# Patient Record
Sex: FEMALE | Race: WHITE | NOT HISPANIC OR LATINO | URBAN - METROPOLITAN AREA
[De-identification: names, ages, dates, MRNs, and addresses within clinical notes are randomized per-mention and may not be internally consistent; named-entity substitution may affect disease eponyms.]

---

## 2018-12-14 ENCOUNTER — EMERGENCY (EMERGENCY)
Facility: HOSPITAL | Age: 78
LOS: 0 days | Discharge: HOME | End: 2018-12-15
Attending: EMERGENCY MEDICINE | Admitting: EMERGENCY MEDICINE

## 2018-12-14 VITALS
RESPIRATION RATE: 18 BRPM | TEMPERATURE: 99 F | DIASTOLIC BLOOD PRESSURE: 93 MMHG | OXYGEN SATURATION: 98 % | SYSTOLIC BLOOD PRESSURE: 152 MMHG | HEART RATE: 100 BPM

## 2018-12-14 VITALS — HEIGHT: 63 IN | WEIGHT: 147.93 LBS

## 2018-12-14 DIAGNOSIS — K21.9 GASTRO-ESOPHAGEAL REFLUX DISEASE WITHOUT ESOPHAGITIS: ICD-10-CM

## 2018-12-14 DIAGNOSIS — Z79.899 OTHER LONG TERM (CURRENT) DRUG THERAPY: ICD-10-CM

## 2018-12-14 DIAGNOSIS — R13.10 DYSPHAGIA, UNSPECIFIED: ICD-10-CM

## 2018-12-14 DIAGNOSIS — R06.00 DYSPNEA, UNSPECIFIED: ICD-10-CM

## 2018-12-14 DIAGNOSIS — E78.00 PURE HYPERCHOLESTEROLEMIA, UNSPECIFIED: ICD-10-CM

## 2018-12-14 DIAGNOSIS — Z79.82 LONG TERM (CURRENT) USE OF ASPIRIN: ICD-10-CM

## 2018-12-14 DIAGNOSIS — Z79.891 LONG TERM (CURRENT) USE OF OPIATE ANALGESIC: ICD-10-CM

## 2018-12-14 DIAGNOSIS — E11.9 TYPE 2 DIABETES MELLITUS WITHOUT COMPLICATIONS: ICD-10-CM

## 2018-12-14 DIAGNOSIS — I10 ESSENTIAL (PRIMARY) HYPERTENSION: ICD-10-CM

## 2018-12-14 LAB
ALBUMIN SERPL ELPH-MCNC: 4.3 G/DL — SIGNIFICANT CHANGE UP (ref 3.5–5.2)
ALP SERPL-CCNC: 61 U/L — SIGNIFICANT CHANGE UP (ref 30–115)
ALT FLD-CCNC: 22 U/L — SIGNIFICANT CHANGE UP (ref 0–41)
ANION GAP SERPL CALC-SCNC: 16 MMOL/L — HIGH (ref 7–14)
APTT BLD: 26.7 SEC — LOW (ref 27–39.2)
AST SERPL-CCNC: 37 U/L — SIGNIFICANT CHANGE UP (ref 0–41)
BASOPHILS # BLD AUTO: 0.04 K/UL — SIGNIFICANT CHANGE UP (ref 0–0.2)
BASOPHILS NFR BLD AUTO: 0.4 % — SIGNIFICANT CHANGE UP (ref 0–1)
BILIRUB SERPL-MCNC: 0.6 MG/DL — SIGNIFICANT CHANGE UP (ref 0.2–1.2)
BUN SERPL-MCNC: 21 MG/DL — HIGH (ref 10–20)
CALCIUM SERPL-MCNC: 9.8 MG/DL — SIGNIFICANT CHANGE UP (ref 8.5–10.1)
CHLORIDE SERPL-SCNC: 95 MMOL/L — LOW (ref 98–110)
CO2 SERPL-SCNC: 27 MMOL/L — SIGNIFICANT CHANGE UP (ref 17–32)
CREAT SERPL-MCNC: 1 MG/DL — SIGNIFICANT CHANGE UP (ref 0.7–1.5)
EOSINOPHIL # BLD AUTO: 0.07 K/UL — SIGNIFICANT CHANGE UP (ref 0–0.7)
EOSINOPHIL NFR BLD AUTO: 0.6 % — SIGNIFICANT CHANGE UP (ref 0–8)
GLUCOSE SERPL-MCNC: 224 MG/DL — HIGH (ref 70–99)
HCT VFR BLD CALC: 40.2 % — SIGNIFICANT CHANGE UP (ref 37–47)
HGB BLD-MCNC: 14 G/DL — SIGNIFICANT CHANGE UP (ref 12–16)
IMM GRANULOCYTES NFR BLD AUTO: 0.3 % — SIGNIFICANT CHANGE UP (ref 0.1–0.3)
INR BLD: 0.92 RATIO — SIGNIFICANT CHANGE UP (ref 0.65–1.3)
LYMPHOCYTES # BLD AUTO: 2.41 K/UL — SIGNIFICANT CHANGE UP (ref 1.2–3.4)
LYMPHOCYTES # BLD AUTO: 22 % — SIGNIFICANT CHANGE UP (ref 20.5–51.1)
MCHC RBC-ENTMCNC: 29.9 PG — SIGNIFICANT CHANGE UP (ref 27–31)
MCHC RBC-ENTMCNC: 34.8 G/DL — SIGNIFICANT CHANGE UP (ref 32–37)
MCV RBC AUTO: 85.7 FL — SIGNIFICANT CHANGE UP (ref 81–99)
MONOCYTES # BLD AUTO: 0.57 K/UL — SIGNIFICANT CHANGE UP (ref 0.1–0.6)
MONOCYTES NFR BLD AUTO: 5.2 % — SIGNIFICANT CHANGE UP (ref 1.7–9.3)
NEUTROPHILS # BLD AUTO: 7.81 K/UL — HIGH (ref 1.4–6.5)
NEUTROPHILS NFR BLD AUTO: 71.5 % — SIGNIFICANT CHANGE UP (ref 42.2–75.2)
NRBC # BLD: 0 /100 WBCS — SIGNIFICANT CHANGE UP (ref 0–0)
PLATELET # BLD AUTO: 229 K/UL — SIGNIFICANT CHANGE UP (ref 130–400)
POTASSIUM SERPL-MCNC: 5.7 MMOL/L — HIGH (ref 3.5–5)
POTASSIUM SERPL-SCNC: 5.7 MMOL/L — HIGH (ref 3.5–5)
PROT SERPL-MCNC: 6.9 G/DL — SIGNIFICANT CHANGE UP (ref 6–8)
PROTHROM AB SERPL-ACNC: 10.6 SEC — SIGNIFICANT CHANGE UP (ref 9.95–12.87)
RBC # BLD: 4.69 M/UL — SIGNIFICANT CHANGE UP (ref 4.2–5.4)
RBC # FLD: 12.1 % — SIGNIFICANT CHANGE UP (ref 11.5–14.5)
SODIUM SERPL-SCNC: 138 MMOL/L — SIGNIFICANT CHANGE UP (ref 135–146)
TROPONIN T SERPL-MCNC: <0.01 NG/ML — SIGNIFICANT CHANGE UP
WBC # BLD: 10.93 K/UL — HIGH (ref 4.8–10.8)
WBC # FLD AUTO: 10.93 K/UL — HIGH (ref 4.8–10.8)

## 2018-12-14 NOTE — ED PROVIDER NOTE - MEDICAL DECISION MAKING DETAILS
pt with neg w/u after choking, attempted to contact dr. atwood, pt is advised to f/u with pmd and gi for dysphagia w/u

## 2018-12-14 NOTE — ED PROVIDER NOTE - PHYSICAL EXAMINATION
CONSTITUTIONAL: Well-appearing; well-nourished; in no apparent distress.   EYES: PERRL; EOM intact.   ENT: normal nose; no rhinorrhea; normal pharynx with no tonsillar hypertrophy. .   CARDIOVASCULAR: Normal S1, S2; no murmurs, rubs, or gallops.   RESPIRATORY: Normal chest excursion with respiration; breath sounds clear and equal bilaterally; no wheezes, rhonchi, or rales.  GI/: Normal bowel sounds; non-distended; non-tender; no palpable organomegaly.   MS: No evidence of trauma or deformity.   SKIN: Normal for age and race; warm; dry; good turgor; no apparent lesions or exudate.   NEURO/PSYCH: A & O x 4; grossly unremarkable.

## 2018-12-14 NOTE — ED PROVIDER NOTE - ATTENDING CONTRIBUTION TO CARE
79 yo f with pmh of dm, gerd, htn, presents with c/o choking on water.  pt was watching tv and started to choke on a sip of water.  as per family, pt has had choking episodes x 4 months, more frequent recently.  as per family, pmd dr. dorothy atwood is aware.  pt has also had slurred speech for about a year and has seen ent and neurology.  family denies pt has had dx of stroke.  as per family, pt had seen a "specialist" for tests for the choking and results came back normal.  pt now feels at baseline.  no cp, no abd pain.  exam: nad, ncat, perrl, eomi, mmm, op open and patent, no swelling, rrr, ctab, abd soft, nt,nd aox3, slurred speech (baseline) imp: pt with increasing episodes of choking on po intake, unclear if ever had w/u for cva, speech and swallow eval.  will check labs and cxr here.  pt is at baseline.  will contact pmd and encourage outpt follow up for dysphagia

## 2018-12-14 NOTE — ED ADULT NURSE NOTE - NSIMPLEMENTINTERV_GEN_ALL_ED
Implemented All Universal Safety Interventions:  [place] to call system. Call bell, personal items and telephone within reach. Instruct patient to call for assistance. Room bathroom lighting operational. Non-slip footwear when patient is off stretcher. Physically safe environment: no spills, clutter or unnecessary equipment. Stretcher in lowest position, wheels locked, appropriate side rails in place.

## 2018-12-14 NOTE — ED PROVIDER NOTE - OBJECTIVE STATEMENT
77 yo female hx of HTN/HLD/DM/GERD present c/o palpitations and sob after a "choking" episode. as per family, patient has had difficulty to swallow over the past year and worsening over the past few months. Family reported she was seen by ENT and Neurology without significant finding. Patient was drinking water and had another episode again tonight so family brought her to ED for evaluation.   Denies exogenous hormone use/recent hospitalization/hx of DVT. denies recent illness/fever/chill/HA/dizziness/sob/abd pain/n/v/d/urinary sxs.

## 2018-12-14 NOTE — ED ADULT NURSE NOTE - OBJECTIVE STATEMENT
pt presents to the ED with c/o having difficulty breathing and a choking sensation today that lasted 5 minutes as per son. PT denies cp,fevers/chills,n/v/d.

## 2018-12-14 NOTE — ED ADULT NURSE NOTE - PMH
DM (diabetes mellitus)    GERD (gastroesophageal reflux disease)    High cholesterol    HTN (hypertension)

## 2019-03-12 PROBLEM — E11.9 TYPE 2 DIABETES MELLITUS WITHOUT COMPLICATIONS: Chronic | Status: ACTIVE | Noted: 2018-12-14

## 2019-03-12 PROBLEM — K21.9 GASTRO-ESOPHAGEAL REFLUX DISEASE WITHOUT ESOPHAGITIS: Chronic | Status: ACTIVE | Noted: 2018-12-14

## 2019-03-12 PROBLEM — E78.00 PURE HYPERCHOLESTEROLEMIA, UNSPECIFIED: Chronic | Status: ACTIVE | Noted: 2018-12-14

## 2019-03-12 PROBLEM — I10 ESSENTIAL (PRIMARY) HYPERTENSION: Chronic | Status: ACTIVE | Noted: 2018-12-14

## 2019-03-15 ENCOUNTER — INPATIENT (INPATIENT)
Facility: HOSPITAL | Age: 79
LOS: 3 days | Discharge: HOME | End: 2019-03-19
Attending: FAMILY MEDICINE | Admitting: FAMILY MEDICINE
Payer: MEDICAID

## 2019-03-15 VITALS
SYSTOLIC BLOOD PRESSURE: 138 MMHG | DIASTOLIC BLOOD PRESSURE: 79 MMHG | HEART RATE: 89 BPM | RESPIRATION RATE: 18 BRPM | OXYGEN SATURATION: 99 % | TEMPERATURE: 97 F

## 2019-03-15 LAB
ALBUMIN SERPL ELPH-MCNC: 4.6 G/DL — SIGNIFICANT CHANGE UP (ref 3.5–5.2)
ALP SERPL-CCNC: 76 U/L — SIGNIFICANT CHANGE UP (ref 30–115)
ALT FLD-CCNC: 18 U/L — SIGNIFICANT CHANGE UP (ref 0–41)
ANION GAP SERPL CALC-SCNC: 15 MMOL/L — HIGH (ref 7–14)
APPEARANCE UR: CLEAR — SIGNIFICANT CHANGE UP
APTT BLD: 29.9 SEC — SIGNIFICANT CHANGE UP (ref 27–39.2)
AST SERPL-CCNC: 23 U/L — SIGNIFICANT CHANGE UP (ref 0–41)
BASOPHILS # BLD AUTO: 0.04 K/UL — SIGNIFICANT CHANGE UP (ref 0–0.2)
BASOPHILS NFR BLD AUTO: 0.6 % — SIGNIFICANT CHANGE UP (ref 0–1)
BILIRUB SERPL-MCNC: 0.9 MG/DL — SIGNIFICANT CHANGE UP (ref 0.2–1.2)
BILIRUB UR-MCNC: NEGATIVE — SIGNIFICANT CHANGE UP
BUN SERPL-MCNC: 11 MG/DL — SIGNIFICANT CHANGE UP (ref 10–20)
CALCIUM SERPL-MCNC: 10.2 MG/DL — HIGH (ref 8.5–10.1)
CHLORIDE SERPL-SCNC: 98 MMOL/L — SIGNIFICANT CHANGE UP (ref 98–110)
CO2 SERPL-SCNC: 28 MMOL/L — SIGNIFICANT CHANGE UP (ref 17–32)
COLOR SPEC: YELLOW — SIGNIFICANT CHANGE UP
CREAT SERPL-MCNC: 0.8 MG/DL — SIGNIFICANT CHANGE UP (ref 0.7–1.5)
DIFF PNL FLD: NEGATIVE — SIGNIFICANT CHANGE UP
EOSINOPHIL # BLD AUTO: 0.1 K/UL — SIGNIFICANT CHANGE UP (ref 0–0.7)
EOSINOPHIL NFR BLD AUTO: 1.4 % — SIGNIFICANT CHANGE UP (ref 0–8)
GLUCOSE SERPL-MCNC: 242 MG/DL — HIGH (ref 70–99)
GLUCOSE UR QL: 500 MG/DL
HCT VFR BLD CALC: 41.8 % — SIGNIFICANT CHANGE UP (ref 37–47)
HGB BLD-MCNC: 14.8 G/DL — SIGNIFICANT CHANGE UP (ref 12–16)
IMM GRANULOCYTES NFR BLD AUTO: 0.3 % — SIGNIFICANT CHANGE UP (ref 0.1–0.3)
INR BLD: 0.96 RATIO — SIGNIFICANT CHANGE UP (ref 0.65–1.3)
KETONES UR-MCNC: NEGATIVE — SIGNIFICANT CHANGE UP
LEUKOCYTE ESTERASE UR-ACNC: NEGATIVE — SIGNIFICANT CHANGE UP
LYMPHOCYTES # BLD AUTO: 2.23 K/UL — SIGNIFICANT CHANGE UP (ref 1.2–3.4)
LYMPHOCYTES # BLD AUTO: 31 % — SIGNIFICANT CHANGE UP (ref 20.5–51.1)
MCHC RBC-ENTMCNC: 30.6 PG — SIGNIFICANT CHANGE UP (ref 27–31)
MCHC RBC-ENTMCNC: 35.4 G/DL — SIGNIFICANT CHANGE UP (ref 32–37)
MCV RBC AUTO: 86.4 FL — SIGNIFICANT CHANGE UP (ref 81–99)
MONOCYTES # BLD AUTO: 0.51 K/UL — SIGNIFICANT CHANGE UP (ref 0.1–0.6)
MONOCYTES NFR BLD AUTO: 7.1 % — SIGNIFICANT CHANGE UP (ref 1.7–9.3)
NEUTROPHILS # BLD AUTO: 4.3 K/UL — SIGNIFICANT CHANGE UP (ref 1.4–6.5)
NEUTROPHILS NFR BLD AUTO: 59.6 % — SIGNIFICANT CHANGE UP (ref 42.2–75.2)
NITRITE UR-MCNC: NEGATIVE — SIGNIFICANT CHANGE UP
NRBC # BLD: 0 /100 WBCS — SIGNIFICANT CHANGE UP (ref 0–0)
PH UR: 7 — SIGNIFICANT CHANGE UP (ref 5–8)
PLATELET # BLD AUTO: 300 K/UL — SIGNIFICANT CHANGE UP (ref 130–400)
POTASSIUM SERPL-MCNC: 3.8 MMOL/L — SIGNIFICANT CHANGE UP (ref 3.5–5)
POTASSIUM SERPL-SCNC: 3.8 MMOL/L — SIGNIFICANT CHANGE UP (ref 3.5–5)
PROT SERPL-MCNC: 7.1 G/DL — SIGNIFICANT CHANGE UP (ref 6–8)
PROT UR-MCNC: NEGATIVE MG/DL — SIGNIFICANT CHANGE UP
PROTHROM AB SERPL-ACNC: 11 SEC — SIGNIFICANT CHANGE UP (ref 9.95–12.87)
RBC # BLD: 4.84 M/UL — SIGNIFICANT CHANGE UP (ref 4.2–5.4)
RBC # FLD: 12 % — SIGNIFICANT CHANGE UP (ref 11.5–14.5)
SODIUM SERPL-SCNC: 141 MMOL/L — SIGNIFICANT CHANGE UP (ref 135–146)
SP GR SPEC: 1.01 — SIGNIFICANT CHANGE UP (ref 1.01–1.03)
TROPONIN T SERPL-MCNC: <0.01 NG/ML — SIGNIFICANT CHANGE UP
UROBILINOGEN FLD QL: 1 MG/DL (ref 0.2–0.2)
WBC # BLD: 7.2 K/UL — SIGNIFICANT CHANGE UP (ref 4.8–10.8)
WBC # FLD AUTO: 7.2 K/UL — SIGNIFICANT CHANGE UP (ref 4.8–10.8)

## 2019-03-15 NOTE — ED PROVIDER NOTE - CLINICAL SUMMARY MEDICAL DECISION MAKING FREE TEXT BOX
patient remained stable in ED, improved well, labs/EKG/CXR findings noted, and discussed with patient and his family in ED,  discussed with MAR, patient is admitted to medicine in stable condition.

## 2019-03-15 NOTE — ED ADULT NURSE NOTE - NSIMPLEMENTINTERV_GEN_ALL_ED
Implemented All Universal Safety Interventions:  [place] to call system. Call bell, personal items and telephone within reach. Instruct patient to call for assistance. Room bathroom lighting operational. Non-slip footwear when patient is off stretcher. Physically safe environment: no spills, clutter or unnecessary equipment. Stretcher in lowest position, wheels locked, appropriate side rails in place.

## 2019-03-15 NOTE — ED PROVIDER NOTE - NS ED ROS FT
Review of Systems:  CONSTITUTIONAL: no fever  EYES: no photophobia, no blurred vision  ENT: no ear pain, no nasal discharge  RESPIRATORY: no shortness of breath, no cough  CARDIAC: no chest pain, no palpitations  GI: no abd pain, no nausea, no vomiting, no diarrhea, no constipation,   : no dysuria; no hematuria,   MUSCULOSKELETAL: no joint paint  NEUROLOGIC: + headache, +slurred speech,   SKIN; no rash

## 2019-03-15 NOTE — ED PROVIDER NOTE - OBJECTIVE STATEMENT
78 y/o F, h/o HTN, HLD, DM, GERD, presents with R sided facial droop with drooling, and sensation of tongue heaviness referred to the ED by her physical therapist worsening for the past 3 days. pts family at bedside, pt has been complaining of intermittent posterior HA for the past 3 months along with multiple other worsening symptoms including changes in gait, worsening slurred speech. no medications taken for symptoms. no known relieving or exacerbating symptoms. denies fever, numbness, tingling, weakness, 80 y/o F, h/o HTN, HLD, DM, GERD, presents with R sided facial droop with drooling, and sensation of tongue heaviness referred to the ED by her physical therapist worsening for the past 3 days. pts family at bedside, pt has been complaining of intermittent posterior HA for the past 3 months along with multiple other worsening symptoms including changes in gait, worsening slurred speech. no medications taken for symptoms. no known relieving or exacerbating symptoms. neurologist: Dr. Burroughs. denies fever, numbness, tingling, weakness,

## 2019-03-15 NOTE — ED ADULT NURSE NOTE - OBJECTIVE STATEMENT
Pt was brought in by family due to increase slurred speech that has been happening over 3 months. Pt ambulatory and bl hand  firm with a left facial droop

## 2019-03-15 NOTE — ED ADULT TRIAGE NOTE - CHIEF COMPLAINT QUOTE
slurred speech for three months, patient being seen by neurologist for symptoms. patient grandson states speech is at baseline.  number is [id number], evaluated patient for further symptoms. alert and in no distress. no new symptoms per grandson and patient. pt alert and oriented x 3 per  phone.

## 2019-03-15 NOTE — ED PROVIDER NOTE - ATTENDING CONTRIBUTION TO CARE
patient is BIB family for worsening speech, and deteriorating neurologic status over the last few months, no trauma.   vitals noted  lungs: CTA  abd; +BS, ND, soft

## 2019-03-15 NOTE — ED PROVIDER NOTE - PROGRESS NOTE DETAILS
spoke with Dr. Redd (on call for pts PMD). wants pt to get an MRI, carotid duplex, and neurology consult. will see pt inpatient tomorrow.

## 2019-03-15 NOTE — ED ADULT NURSE NOTE - CHIEF COMPLAINT QUOTE
slurred speech for three months, patient being seen by neurologist for symptoms. patient grandson states speech is at baseline.  number is [id number], evaluated patient for further symptoms. alert and in no distress. no new symptoms per grandson and patient. pt alert and oriented x 3 per  phone.

## 2019-03-15 NOTE — ED PROVIDER NOTE - PHYSICAL EXAMINATION
VITAL SIGNS: I have reviewed nursing notes and confirm.  CONSTITUTIONAL: Well-developed; well-nourished; in no acute distress.  SKIN: Skin exam is warm and dry, <2 sec cap refill  HEAD: Normocephalic; atraumatic.  EYES: PERRL, EOM intact; normal conjunctiva.  ENT: MMM; airway clear.   NECK: Supple; non tender.  CARD: RRR, 2+ dp pulses  RESP: No wheezes, rales or rhonchi, speaking in full sentences  ABD: soft non tender.   EXT: Normal ROM. No edema.  NEURO: Alert and oriented, + R sided facial droop, Strength 5/5 x 4 ext and symmetric, nml gross motor movement, neurovascularly intact. negative  negative pronator drift. normal gait,  normal LUCY  PSYCH: Cooperative, appropriate.

## 2019-03-16 DIAGNOSIS — Z87.19 PERSONAL HISTORY OF OTHER DISEASES OF THE DIGESTIVE SYSTEM: Chronic | ICD-10-CM

## 2019-03-16 DIAGNOSIS — Z90.49 ACQUIRED ABSENCE OF OTHER SPECIFIED PARTS OF DIGESTIVE TRACT: Chronic | ICD-10-CM

## 2019-03-16 DIAGNOSIS — Z98.890 OTHER SPECIFIED POSTPROCEDURAL STATES: Chronic | ICD-10-CM

## 2019-03-16 LAB
CHOLEST SERPL-MCNC: 155 MG/DL — SIGNIFICANT CHANGE UP (ref 100–200)
ESTIMATED AVERAGE GLUCOSE: 206 MG/DL — HIGH (ref 68–114)
GLUCOSE BLDC GLUCOMTR-MCNC: 102 MG/DL — HIGH (ref 70–99)
GLUCOSE BLDC GLUCOMTR-MCNC: 194 MG/DL — HIGH (ref 70–99)
GLUCOSE BLDC GLUCOMTR-MCNC: 202 MG/DL — HIGH (ref 70–99)
GLUCOSE BLDC GLUCOMTR-MCNC: 210 MG/DL — HIGH (ref 70–99)
HBA1C BLD-MCNC: 8.8 % — HIGH (ref 4–5.6)
HDLC SERPL-MCNC: 40 MG/DL — LOW
LIPID PNL WITH DIRECT LDL SERPL: 112 MG/DL — SIGNIFICANT CHANGE UP (ref 4–129)
TOTAL CHOLESTEROL/HDL RATIO MEASUREMENT: 3.9 RATIO — LOW (ref 4–5.5)
TRIGL SERPL-MCNC: 80 MG/DL — SIGNIFICANT CHANGE UP (ref 10–149)

## 2019-03-16 PROCEDURE — 93880 EXTRACRANIAL BILAT STUDY: CPT | Mod: 26

## 2019-03-16 RX ORDER — LISINOPRIL 2.5 MG/1
5 TABLET ORAL DAILY
Qty: 0 | Refills: 0 | Status: DISCONTINUED | OUTPATIENT
Start: 2019-03-16 | End: 2019-03-18

## 2019-03-16 RX ORDER — INSULIN GLARGINE 100 [IU]/ML
5 INJECTION, SOLUTION SUBCUTANEOUS EVERY MORNING
Qty: 0 | Refills: 0 | Status: DISCONTINUED | OUTPATIENT
Start: 2019-03-16 | End: 2019-03-19

## 2019-03-16 RX ORDER — CHLORHEXIDINE GLUCONATE 213 G/1000ML
1 SOLUTION TOPICAL
Qty: 0 | Refills: 0 | Status: DISCONTINUED | OUTPATIENT
Start: 2019-03-16 | End: 2019-03-19

## 2019-03-16 RX ORDER — ATORVASTATIN CALCIUM 80 MG/1
0 TABLET, FILM COATED ORAL
Qty: 0 | Refills: 0 | COMMUNITY

## 2019-03-16 RX ORDER — ENOXAPARIN SODIUM 100 MG/ML
40 INJECTION SUBCUTANEOUS EVERY 24 HOURS
Qty: 0 | Refills: 0 | Status: DISCONTINUED | OUTPATIENT
Start: 2019-03-16 | End: 2019-03-19

## 2019-03-16 RX ORDER — DEXTROSE 50 % IN WATER 50 %
25 SYRINGE (ML) INTRAVENOUS ONCE
Qty: 0 | Refills: 0 | Status: DISCONTINUED | OUTPATIENT
Start: 2019-03-16 | End: 2019-03-19

## 2019-03-16 RX ORDER — GABAPENTIN 400 MG/1
0 CAPSULE ORAL
Qty: 0 | Refills: 0 | COMMUNITY

## 2019-03-16 RX ORDER — HYDROCHLOROTHIAZIDE 25 MG
25 TABLET ORAL DAILY
Qty: 0 | Refills: 0 | Status: DISCONTINUED | OUTPATIENT
Start: 2019-03-16 | End: 2019-03-19

## 2019-03-16 RX ORDER — ESOMEPRAZOLE MAGNESIUM 40 MG/1
1 CAPSULE, DELAYED RELEASE ORAL
Qty: 0 | Refills: 0 | COMMUNITY

## 2019-03-16 RX ORDER — INSULIN LISPRO 100/ML
VIAL (ML) SUBCUTANEOUS
Qty: 0 | Refills: 0 | Status: DISCONTINUED | OUTPATIENT
Start: 2019-03-16 | End: 2019-03-19

## 2019-03-16 RX ORDER — LEVETIRACETAM 250 MG/1
250 TABLET, FILM COATED ORAL
Qty: 0 | Refills: 0 | Status: DISCONTINUED | OUTPATIENT
Start: 2019-03-16 | End: 2019-03-19

## 2019-03-16 RX ORDER — FAMOTIDINE 10 MG/ML
0 INJECTION INTRAVENOUS
Qty: 0 | Refills: 0 | COMMUNITY

## 2019-03-16 RX ORDER — ASPIRIN/CALCIUM CARB/MAGNESIUM 324 MG
81 TABLET ORAL DAILY
Qty: 0 | Refills: 0 | Status: DISCONTINUED | OUTPATIENT
Start: 2019-03-16 | End: 2019-03-19

## 2019-03-16 RX ORDER — LEVETIRACETAM 250 MG/1
250 TABLET, FILM COATED ORAL ONCE
Qty: 0 | Refills: 0 | Status: COMPLETED | OUTPATIENT
Start: 2019-03-16 | End: 2019-03-16

## 2019-03-16 RX ORDER — CLONAZEPAM 1 MG
0 TABLET ORAL
Qty: 0 | Refills: 0 | COMMUNITY

## 2019-03-16 RX ORDER — GLIMEPIRIDE 1 MG
0 TABLET ORAL
Qty: 0 | Refills: 0 | COMMUNITY

## 2019-03-16 RX ORDER — SODIUM CHLORIDE 9 MG/ML
1000 INJECTION, SOLUTION INTRAVENOUS
Qty: 0 | Refills: 0 | Status: DISCONTINUED | OUTPATIENT
Start: 2019-03-16 | End: 2019-03-19

## 2019-03-16 RX ORDER — GABAPENTIN 400 MG/1
300 CAPSULE ORAL THREE TIMES A DAY
Qty: 0 | Refills: 0 | Status: DISCONTINUED | OUTPATIENT
Start: 2019-03-16 | End: 2019-03-19

## 2019-03-16 RX ORDER — ASPIRIN/CALCIUM CARB/MAGNESIUM 324 MG
1 TABLET ORAL
Qty: 0 | Refills: 0 | COMMUNITY

## 2019-03-16 RX ORDER — DEXTROSE 50 % IN WATER 50 %
15 SYRINGE (ML) INTRAVENOUS ONCE
Qty: 0 | Refills: 0 | Status: DISCONTINUED | OUTPATIENT
Start: 2019-03-16 | End: 2019-03-19

## 2019-03-16 RX ORDER — INSULIN LISPRO 100/ML
5 VIAL (ML) SUBCUTANEOUS
Qty: 0 | Refills: 0 | Status: DISCONTINUED | OUTPATIENT
Start: 2019-03-16 | End: 2019-03-19

## 2019-03-16 RX ORDER — GLUCAGON INJECTION, SOLUTION 0.5 MG/.1ML
1 INJECTION, SOLUTION SUBCUTANEOUS ONCE
Qty: 0 | Refills: 0 | Status: DISCONTINUED | OUTPATIENT
Start: 2019-03-16 | End: 2019-03-19

## 2019-03-16 RX ORDER — METFORMIN HYDROCHLORIDE 850 MG/1
1 TABLET ORAL
Qty: 0 | Refills: 0 | COMMUNITY

## 2019-03-16 RX ORDER — DEXTROSE 50 % IN WATER 50 %
12.5 SYRINGE (ML) INTRAVENOUS ONCE
Qty: 0 | Refills: 0 | Status: DISCONTINUED | OUTPATIENT
Start: 2019-03-16 | End: 2019-03-19

## 2019-03-16 RX ORDER — ATORVASTATIN CALCIUM 80 MG/1
40 TABLET, FILM COATED ORAL AT BEDTIME
Qty: 0 | Refills: 0 | Status: DISCONTINUED | OUTPATIENT
Start: 2019-03-16 | End: 2019-03-19

## 2019-03-16 RX ORDER — OMEPRAZOLE 10 MG/1
0 CAPSULE, DELAYED RELEASE ORAL
Qty: 0 | Refills: 0 | COMMUNITY

## 2019-03-16 RX ORDER — PANTOPRAZOLE SODIUM 20 MG/1
40 TABLET, DELAYED RELEASE ORAL
Qty: 0 | Refills: 0 | Status: DISCONTINUED | OUTPATIENT
Start: 2019-03-16 | End: 2019-03-19

## 2019-03-16 RX ADMIN — PANTOPRAZOLE SODIUM 40 MILLIGRAM(S): 20 TABLET, DELAYED RELEASE ORAL at 06:54

## 2019-03-16 RX ADMIN — INSULIN GLARGINE 5 UNIT(S): 100 INJECTION, SOLUTION SUBCUTANEOUS at 11:43

## 2019-03-16 RX ADMIN — LEVETIRACETAM 250 MILLIGRAM(S): 250 TABLET, FILM COATED ORAL at 11:39

## 2019-03-16 RX ADMIN — GABAPENTIN 300 MILLIGRAM(S): 400 CAPSULE ORAL at 06:54

## 2019-03-16 RX ADMIN — Medication 25 MILLIGRAM(S): at 06:54

## 2019-03-16 RX ADMIN — ENOXAPARIN SODIUM 40 MILLIGRAM(S): 100 INJECTION SUBCUTANEOUS at 06:55

## 2019-03-16 RX ADMIN — Medication 2: at 11:43

## 2019-03-16 RX ADMIN — GABAPENTIN 300 MILLIGRAM(S): 400 CAPSULE ORAL at 13:09

## 2019-03-16 RX ADMIN — LEVETIRACETAM 250 MILLIGRAM(S): 250 TABLET, FILM COATED ORAL at 22:09

## 2019-03-16 RX ADMIN — GABAPENTIN 300 MILLIGRAM(S): 400 CAPSULE ORAL at 22:09

## 2019-03-16 RX ADMIN — Medication 5 UNIT(S): at 11:43

## 2019-03-16 RX ADMIN — Medication 81 MILLIGRAM(S): at 11:39

## 2019-03-16 RX ADMIN — ATORVASTATIN CALCIUM 40 MILLIGRAM(S): 80 TABLET, FILM COATED ORAL at 22:09

## 2019-03-16 RX ADMIN — LISINOPRIL 5 MILLIGRAM(S): 2.5 TABLET ORAL at 06:54

## 2019-03-16 NOTE — H&P ADULT - NSICDXPASTMEDICALHX_GEN_ALL_CORE_FT
PAST MEDICAL HISTORY:  DM (diabetes mellitus)     GERD (gastroesophageal reflux disease)     High cholesterol     HTN (hypertension)

## 2019-03-16 NOTE — H&P ADULT - NSHPLABSRESULTS_GEN_ALL_CORE
14.8   7.20  )-----------( 300      ( 15 Mar 2019 19:22 )             41.8     03-15    141  |  98  |  11  ----------------------------<  242<H>  3.8   |  28  |  0.8    Ca    10.2<H>      15 Mar 2019 19:22    TPro  7.1  /  Alb  4.6  /  TBili  0.9  /  DBili  x   /  AST  23  /  ALT  18  /  AlkPhos  76  03-15    < from: CT Head No Cont (03.15.19 @ 20:11) >      Impression:    No acute intracranial abnormality.    < end of copied text >

## 2019-03-16 NOTE — H&P ADULT - NSHPPHYSICALEXAM_GEN_ALL_CORE
T(C): 36 (03-16-19 @ 00:12), Max: 36.1 (03-15-19 @ 17:44)  HR: 81 (03-16-19 @ 00:12) (81 - 89)  BP: 151/84 (03-16-19 @ 00:12) (138/79 - 151/84)  RR: 18 (03-16-19 @ 00:12) (18 - 18)  SpO2: 99% (03-16-19 @ 00:12) (99% - 99%)    PHYSICAL EXAM:  GENERAL: NAD, well-developed  HEAD:  Atraumatic, Normocephalic  NECK: Supple, No JVD  CHEST/LUNG: Clear to auscultation bilaterally; No wheeze  HEART: Regular rate and rhythm; No murmurs, rubs, or gallops  ABDOMEN: Soft, Nontender, Nondistended; Bowel sounds present  EXTREMITIES:  2+ Peripheral Pulses, No clubbing, cyanosis, or edema  PSYCH: AAO x 3  NEUROLOGY: Right sided facial droop, No tongue deviation, Motor strength 5/5 Sensation intact, No pronator drift  SKIN: No rashes or lesions

## 2019-03-16 NOTE — CONSULT NOTE ADULT - SUBJECTIVE AND OBJECTIVE BOX
Neurology Consult    Patient is a 79y old  Female who presents with a chief complaint of Slurred speech and right sided weakness (16 Mar 2019 02:29)      HPI:  80 yo F with PMh of GERD, DM was sent to the ED by her PMD for the right sided droop, slurred speech and the weakness fo three months. She is unable to ambulate, difficulty swallowing, tongue heaviness. The symptoms were getting worse for the past three days that was noticed by the physical therapist. Actually patients states that this has been going on from last three months.        She was seen by the neurologist outside. She is sent here to the ED for the stroke workup. In the ED patient had CT scan and it showed no intracranial pathology. Daughter will bring all her medications in the morning. (16 Mar 2019 02:29)    Patient is examined at the bedside. She is aaox3, has difficulty articulating her speech and swallowing. Patient is unable to ambulate and is very unsteady on her feet. Patient'  symptoms started about 3 months ago and has been gradually getting worse. Currently she feels tired and fatigued with diffuse muscular weakness.  CTH is negative for acute intracranial pathology      PAST MEDICAL & SURGICAL HISTORY:  High cholesterol  HTN (hypertension)  GERD (gastroesophageal reflux disease)  DM (diabetes mellitus)  H/O appendicitis  S/P cholecystectomy  H/O hernia repair      FAMILY HISTORY:  No pertinent family history in first degree relatives      Social History: (-) x 3    Allergies    No Known Allergies    Intolerances        MEDICATIONS  (STANDING):  aspirin  chewable 81 milliGRAM(s) Oral daily  atorvastatin 40 milliGRAM(s) Oral at bedtime  chlorhexidine 4% Liquid 1 Application(s) Topical two times a day  dextrose 5%. 1000 milliLiter(s) (50 mL/Hr) IV Continuous <Continuous>  dextrose 50% Injectable 12.5 Gram(s) IV Push once  dextrose 50% Injectable 25 Gram(s) IV Push once  dextrose 50% Injectable 25 Gram(s) IV Push once  enoxaparin Injectable 40 milliGRAM(s) SubCutaneous every 24 hours  gabapentin 300 milliGRAM(s) Oral three times a day  hydrochlorothiazide 25 milliGRAM(s) Oral daily  insulin glargine Injectable (LANTUS) 5 Unit(s) SubCutaneous every morning  insulin lispro (HumaLOG) corrective regimen sliding scale   SubCutaneous three times a day before meals  insulin lispro Injectable (HumaLOG) 5 Unit(s) SubCutaneous three times a day before meals  levETIRAcetam 250 milliGRAM(s) Oral two times a day  lisinopril 5 milliGRAM(s) Oral daily  pantoprazole    Tablet 40 milliGRAM(s) Oral before breakfast    MEDICATIONS  (PRN):  dextrose 40% Gel 15 Gram(s) Oral once PRN Blood Glucose LESS THAN 70 milliGRAM(s)/deciliter  glucagon  Injectable 1 milliGRAM(s) IntraMuscular once PRN Glucose LESS THAN 70 milligrams/deciliter      Review of systems:    Constitutional: No fever, weight loss or fatigue    Eyes: No eye pain or discharge  ENMT:  No difficulty hearing; No sinus or throat pain  Neck: No pain or stiffness  Respiratory: No cough, wheezing, chills or hemoptysis  Cardiovascular: No chest pain, palpitations, shortness of breath, dyspnea on exertion  Gastrointestinal: No abdominal pain, nausea, vomiting or hematemesis; No diarrhea or constipation.   Genitourinary: No dysuria, frequency, hematuria or incontinence  Neurological: As per HPI  Skin: No rashes or lesions   Endocrine: No heat or cold intolerance; No hair loss  Musculoskeletal: No joint pain or swelling  Psychiatric: No depression, anxiety, mood swings  Heme/Lymph: No easy bruising or bleeding gums    Vital Signs Last 24 Hrs  T(C): 36.8 (16 Mar 2019 13:15), Max: 36.8 (16 Mar 2019 13:15)  T(F): 98.3 (16 Mar 2019 13:15), Max: 98.3 (16 Mar 2019 13:15)  HR: 74 (16 Mar 2019 13:15) (74 - 89)  BP: 122/69 (16 Mar 2019 13:15) (122/69 - 168/63)  BP(mean): 109 (16 Mar 2019 00:12) (109 - 109)  RR: 18 (16 Mar 2019 13:15) (18 - 20)  SpO2: 97% (16 Mar 2019 08:05) (96% - 99%)    Neurologic Examination:  General:  Appearance is consistent with chronologic age.  No abnormal facies.   General: The patient is oriented to person, place, time and date.  Recent and remote memory intact.  Speech is dysarthric.    Cranial nerves: intact VA, VFF.  EOMI w/o nystagmus, skew or reported double vision.  PERRL.  No ptosis/weakness of eyelid closure.  Facial sensation is normal with normal bite.  No facial asymmetry.  Hearing grossly intact b/l.  Palate elevates midline.  Tongue midline.  Motor examination:   Normal tone, bulk and range of motion.  No tenderness, twitching, tremors or involuntary movements.  Formal Muscle Strength Testing: (MRC grade R/L) 4/5 UE; 4-/5 LE.  No observable drift.  Reflexes: brisk   3+ b/l pectoralis, biceps, triceps, brachioradialis, patella and Achilles.  Plantar response upgoing b/l.  Jaw jerk, Funmilayo, clonus absent.  Sensory examination:   Intact to light touch and pinprick, pain, temperature and proprioception and vibration in all extremities.  Cerebellum:   FTN/HKS intact with normal LUCY in all limbs.      Labs:   CBC Full  -  ( 15 Mar 2019 19:22 )  WBC Count : 7.20 K/uL  Hemoglobin : 14.8 g/dL  Hematocrit : 41.8 %  Platelet Count - Automated : 300 K/uL  Mean Cell Volume : 86.4 fL  Mean Cell Hemoglobin : 30.6 pg  Mean Cell Hemoglobin Concentration : 35.4 g/dL  Auto Neutrophil # : 4.30 K/uL  Auto Lymphocyte # : 2.23 K/uL  Auto Monocyte # : 0.51 K/uL  Auto Eosinophil # : 0.10 K/uL  Auto Basophil # : 0.04 K/uL  Auto Neutrophil % : 59.6 %  Auto Lymphocyte % : 31.0 %  Auto Monocyte % : 7.1 %  Auto Eosinophil % : 1.4 %  Auto Basophil % : 0.6 %    03-15    141  |  98  |  11  ----------------------------<  242<H>  3.8   |  28  |  0.8    Ca    10.2<H>      15 Mar 2019 19:22    TPro  7.1  /  Alb  4.6  /  TBili  0.9  /  DBili  x   /  AST  23  /  ALT  18  /  AlkPhos  76  03-15    LIVER FUNCTIONS - ( 15 Mar 2019 19:22 )  Alb: 4.6 g/dL / Pro: 7.1 g/dL / ALK PHOS: 76 U/L / ALT: 18 U/L / AST: 23 U/L / GGT: x           PT/INR - ( 15 Mar 2019 19:22 )   PT: 11.00 sec;   INR: 0.96 ratio         PTT - ( 15 Mar 2019 19:22 )  PTT:29.9 sec  Urinalysis Basic - ( 15 Mar 2019 20:00 )    Color: Yellow / Appearance: Clear / S.015 / pH: x  Gluc: x / Ketone: Negative  / Bili: Negative / Urobili: 1.0 mg/dL   Blood: x / Protein: Negative mg/dL / Nitrite: Negative   Leuk Esterase: Negative / RBC: x / WBC x   Sq Epi: x / Non Sq Epi: x / Bacteria: x          Neuroimaging:  NCHCT: CT Head No Cont:   EXAM:  CT BRAIN            PROCEDURE DATE:  03/15/2019            INTERPRETATION:  Clinical History / Reason for exam: Slurred speech    Technique: Non-contrast multiaxial CT scan of the head was performed from   base of the skull to the vertex.     Comparison: None    Findings:    The ventricular system, basal cisterns sulcal pattern are appropriate for   patients age.    There is no evidence of intra or extra-axial hemorrhage or fluid   collection.  No mass effect or midline shift is seen.     The gray-white matter differentiation is preserved.  Mild periventricular   and subcortical white matter hypodensities are noted without mass effect   compatible with microvascular ischemic changes.     There is no calvarial fracture.    Visualized paranasal sinuses and mastoid air cells are within normal   limits.    Impression:    No acute intracranial abnormality.                  GELY MCCARTHY M.D., ATTENDING RADIOLOGIST  This document has been electronically signed. Mar 15 2019  8:45PM            (03-15-19 @ 20:11) Neurology Consult    Patient is a 79y old  Female who presents with a chief complaint of Slurred speech and right sided weakness   history provided by one of the family members who speaks english    PAtient has had difficulty swallowing for few months, worsening speech- patient has difficulty walking as well-  now not able to walk independently      HPI:  80 yo F with PMh of GERD, DM was sent to the ED by her PMD for the right sided droop, slurred speech and the weakness fo three months. She is unable to ambulate, difficulty swallowing, tongue heaviness. The symptoms were getting worse for the past three days that was noticed by the physical therapist. Actually patients states that this has been going on from last three months.        She was seen by the neurologist outside. She is sent here to the ED for the stroke workup. In the ED patient had CT scan and it showed no intracranial pathology. Daughter will bring all her medications in the morning. (16 Mar 2019 02:29)    Patient is examined at the bedside. She is aaox3, has difficulty articulating her speech and swallowing. Patient is unable to ambulate and is very unsteady on her feet. Patient'  symptoms started about 3 months ago and has been gradually getting worse. Currently she feels tired and fatigued with diffuse muscular weakness.  CTH is negative for acute intracranial pathology      PAST MEDICAL & SURGICAL HISTORY:  High cholesterol  HTN (hypertension)  GERD (gastroesophageal reflux disease)  DM (diabetes mellitus)  H/O appendicitis  S/P cholecystectomy  H/O hernia repair    FAMILY HISTORY:  No pertinent family history in first degree relatives    Social History: lives with family- now dependent for ADLs, no addictions    Allergies    No Known Allergies      MEDICATIONS  (STANDING):  aspirin  chewable 81 milliGRAM(s) Oral daily  atorvastatin 40 milliGRAM(s) Oral at bedtime  chlorhexidine 4% Liquid 1 Application(s) Topical two times a day  dextrose 5%. 1000 milliLiter(s) (50 mL/Hr) IV Continuous <Continuous>  dextrose 50% Injectable 12.5 Gram(s) IV Push once  dextrose 50% Injectable 25 Gram(s) IV Push once  dextrose 50% Injectable 25 Gram(s) IV Push once  enoxaparin Injectable 40 milliGRAM(s) SubCutaneous every 24 hours  gabapentin 300 milliGRAM(s) Oral three times a day  hydrochlorothiazide 25 milliGRAM(s) Oral daily  insulin glargine Injectable (LANTUS) 5 Unit(s) SubCutaneous every morning  insulin lispro (HumaLOG) corrective regimen sliding scale   SubCutaneous three times a day before meals  insulin lispro Injectable (HumaLOG) 5 Unit(s) SubCutaneous three times a day before meals  levETIRAcetam 250 milliGRAM(s) Oral two times a day  lisinopril 5 milliGRAM(s) Oral daily  pantoprazole    Tablet 40 milliGRAM(s) Oral before breakfast    MEDICATIONS  (PRN):  dextrose 40% Gel 15 Gram(s) Oral once PRN Blood Glucose LESS THAN 70 milliGRAM(s)/deciliter  glucagon  Injectable 1 milliGRAM(s) IntraMuscular once PRN Glucose LESS THAN 70 milligrams/deciliter      Review of systems:    Constitutional: No fever, weight loss ++  Eyes: No eye pain or discharge  ENMT:  No difficulty hearing; No sinus or throat pain  Neck: No pain or stiffness  Respiratory: No cough, wheezing, chills or hemoptysis  Cardiovascular: No chest pain, palpitations, shortness of breath, dyspnea on exertion  Gastrointestinal: No abdominal pain, nausea, vomiting or hematemesis; No diarrhea or constipation.   Genitourinary: No dysuria, frequency, hematuria or incontinence  Neurological: As per HPI  Skin: No rashes or lesions   Endocrine: No heat or cold intolerance; No hair loss  Musculoskeletal: No joint pain or swelling  Psychiatric: No depression, anxiety, mood swings  Heme/Lymph: No easy bruising or bleeding gums    Vital Signs Last 24 Hrs  T(C): 36.8 (16 Mar 2019 13:15), Max: 36.8 (16 Mar 2019 13:15)  T(F): 98.3 (16 Mar 2019 13:15), Max: 98.3 (16 Mar 2019 13:15)  HR: 74 (16 Mar 2019 13:15) (74 - 89)  BP: 122/69 (16 Mar 2019 13:15) (122/69 - 168/63)  BP(mean): 109 (16 Mar 2019 00:12) (109 - 109)  RR: 18 (16 Mar 2019 13:15) (18 - 20)  SpO2: 97% (16 Mar 2019 08:05) (96% - 99%)    Neurologic Examination:  General:  Appearance is consistent with chronologic age.  No abnormal facies.   General: The patient is oriented to person, place, time and date.  Recent and remote memory intact.  Speech is dysarthric.    Cranial nerves:  EOMI w/o nystagmus, skew or reported double vision.  PERRL.  No ptosis/weakness of eyelid closure.  Facial sensation is normal with normal bite.  No facial asymmetry.  Hearing grossly intact b/l.  Palate elevates midline.  Tongue midline. noted some tongue fasciculations- but no tongue atrophy  Motor examination:   Normal tone, bulk and range of motion.  No fasciculations noted  Formal Muscle Strength Testing: (MRC grade R/L) 4/5 UE; 4-/5 LE.  No observable drift.  Reflexes: brisk   3+ b/l pectoralis, biceps, triceps, brachioradialis, patella and Achilles.  Plantar response upgoing b/l.    Sensory examination:   Intact to light touch and pinprick, pain, temperature and proprioception and vibration in all extremities.  Cerebellum:   FTN/HKS intact with normal LUCY in all limbs.      Labs:   CBC Full  -  ( 15 Mar 2019 19:22 )  WBC Count : 7.20 K/uL  Hemoglobin : 14.8 g/dL  Hematocrit : 41.8 %  Platelet Count - Automated : 300 K/uL  Mean Cell Volume : 86.4 fL  Mean Cell Hemoglobin : 30.6 pg  Mean Cell Hemoglobin Concentration : 35.4 g/dL  Auto Neutrophil # : 4.30 K/uL  Auto Lymphocyte # : 2.23 K/uL  Auto Monocyte # : 0.51 K/uL  Auto Eosinophil # : 0.10 K/uL  Auto Basophil # : 0.04 K/uL  Auto Neutrophil % : 59.6 %  Auto Lymphocyte % : 31.0 %  Auto Monocyte % : 7.1 %  Auto Eosinophil % : 1.4 %  Auto Basophil % : 0.6 %    03-15    141  |  98  |  11  ----------------------------<  242<H>  3.8   |  28  |  0.8    Ca    10.2<H>      15 Mar 2019 19:22    TPro  7.1  /  Alb  4.6  /  TBili  0.9  /  DBili  x   /  AST  23  /  ALT  18  /  AlkPhos  76  03-15    LIVER FUNCTIONS - ( 15 Mar 2019 19:22 )  Alb: 4.6 g/dL / Pro: 7.1 g/dL / ALK PHOS: 76 U/L / ALT: 18 U/L / AST: 23 U/L / GGT: x           PT/INR - ( 15 Mar 2019 19:22 )   PT: 11.00 sec;   INR: 0.96 ratio         PTT - ( 15 Mar 2019 19:22 )  PTT:29.9 sec  Urinalysis Basic - ( 15 Mar 2019 20:00 )    Color: Yellow / Appearance: Clear / S.015 / pH: x  Gluc: x / Ketone: Negative  / Bili: Negative / Urobili: 1.0 mg/dL   Blood: x / Protein: Negative mg/dL / Nitrite: Negative   Leuk Esterase: Negative / RBC: x / WBC x   Sq Epi: x / Non Sq Epi: x / Bacteria: x    Neuroimaging:  NCHCT: CT Head No Cont:   EXAM:  CT BRAIN            PROCEDURE DATE:  03/15/2019     INTERPRETATION:  Clinical History / Reason for exam: Slurred speech    Technique: Non-contrast multiaxial CT scan of the head was performed from   base of the skull to the vertex.     Comparison: None    Findings:    The ventricular system, basal cisterns sulcal pattern are appropriate for   patients age.    There is no evidence of intra or extra-axial hemorrhage or fluid   collection.  No mass effect or midline shift is seen.     The gray-white matter differentiation is preserved.  Mild periventricular   and subcortical white matter hypodensities are noted without mass effect   compatible with microvascular ischemic changes.     There is no calvarial fracture.    Visualized paranasal sinuses and mastoid air cells are within normal   limits.    Impression:    No acute intracranial abnormality.

## 2019-03-16 NOTE — H&P ADULT - ASSESSMENT
80 yo F comes to ED for the Right sided facial droop and dysarthria.     # Right sided droop and facial droop for three months r/o CVA   - CT scan normal   - MRI of head to rule out post. stroke  - Aspirin and Atorvastatin  - Speech and swallow assessment   - Physical therapy evauation  - Check lipid profile   - Check Carotid duplex   # DM2  - Start on insulin basal bolus  - Check fingersticks regularly  - HbA1c    # HTN  - Lisinopril 5 mg daily    # DVT ppx  - Lovenox    # GERD  - Protonix    # Activity: Bed rest    # Dispo  - Home

## 2019-03-16 NOTE — H&P ADULT - HISTORY OF PRESENT ILLNESS
78 yo F with PMh of GERD, DM was sent to the ED by her PMD for the right sided droop, slurred speech and the weakness fo three months. She is unable to ambulate, difficulty swallowing, tongue heaviness. The symptoms were worsening daily.       She was seen by the neurologist outside. She is sent here to the ED for the stroke workup. In the ED patient had CT scan and it showed no intracranial pathology. 78 yo F with PMh of GERD, DM was sent to the ED by her PMD for the right sided droop, slurred speech and the weakness fo three months. She is unable to ambulate, difficulty swallowing, tongue heaviness. The symptoms were getting worse.      She was seen by the neurologist outside. She is sent here to the ED for the stroke workup. In the ED patient had CT scan and it showed no intracranial pathology. Daughter will bring all her medications in the morning. 78 yo F with PMh of GERD, DM was sent to the ED by her PMD for the right sided droop, slurred speech and the weakness fo three months. She is unable to ambulate, difficulty swallowing, tongue heaviness. The symptoms were getting worse for the past three days that was noticed by the physical therapist. Actually patients states that this has been going on from last three months.        She was seen by the neurologist outside. She is sent here to the ED for the stroke workup. In the ED patient had CT scan and it showed no intracranial pathology. Daughter will bring all her medications in the morning.

## 2019-03-16 NOTE — PROGRESS NOTE ADULT - SUBJECTIVE AND OBJECTIVE BOX
80 yo woman known to us, GERD, DM, last three months apparently had been having slowly worsening ambulation, dysphagia, and rt-sided facial droop with slurred speech.  Sent in bc PT noticed worsening sxs.  CT head unremarkable; MRI pending.  PT pending.  Neuro consult appreciated - concerned for ALS and other upper motor neuron diagnoses, amongst others.  CVA still a possibility.    PE deferred as pt was en route to MRI this evening.  Per prior notes Neuro exam noted and corroborated with above initial sxs.  Overall seemed to be stable.

## 2019-03-16 NOTE — CONSULT NOTE ADULT - ATTENDING COMMENTS
Patient examined with Neuro PA- agree with note above    Patient has progressive dysarthria, dysphagia, difficulty walking- exam shows brisk reflexes- ? some tongue fasciculations- but no systemic fasciculations notes on this exam-  however the presentation and exam is consistent with motor neuron disease- the progression over 3-6 months is more consistent with ALS- although LMN signs were not found on this exam   EMG/NCS as outpatient  MRI brain/ C spine without contrast  check B12/ supplement if <400  swallow test

## 2019-03-16 NOTE — PROGRESS NOTE ADULT - ASSESSMENT
Rt facial droop, weakness, dysphagia  - CVA vs progressive neurological cause - per Neuro could be UMN  - w/u currently pending per Neuro  - PT  - f/u MRI    Will follow

## 2019-03-17 LAB
GLUCOSE BLDC GLUCOMTR-MCNC: 142 MG/DL — HIGH (ref 70–99)
GLUCOSE BLDC GLUCOMTR-MCNC: 151 MG/DL — HIGH (ref 70–99)
GLUCOSE BLDC GLUCOMTR-MCNC: 201 MG/DL — HIGH (ref 70–99)
GLUCOSE BLDC GLUCOMTR-MCNC: 92 MG/DL — SIGNIFICANT CHANGE UP (ref 70–99)

## 2019-03-17 RX ADMIN — GABAPENTIN 300 MILLIGRAM(S): 400 CAPSULE ORAL at 05:43

## 2019-03-17 RX ADMIN — Medication 5 UNIT(S): at 09:19

## 2019-03-17 RX ADMIN — Medication 5 UNIT(S): at 18:36

## 2019-03-17 RX ADMIN — ATORVASTATIN CALCIUM 40 MILLIGRAM(S): 80 TABLET, FILM COATED ORAL at 22:23

## 2019-03-17 RX ADMIN — ENOXAPARIN SODIUM 40 MILLIGRAM(S): 100 INJECTION SUBCUTANEOUS at 05:52

## 2019-03-17 RX ADMIN — Medication 2: at 09:17

## 2019-03-17 RX ADMIN — GABAPENTIN 300 MILLIGRAM(S): 400 CAPSULE ORAL at 22:22

## 2019-03-17 RX ADMIN — LEVETIRACETAM 250 MILLIGRAM(S): 250 TABLET, FILM COATED ORAL at 05:43

## 2019-03-17 RX ADMIN — GABAPENTIN 300 MILLIGRAM(S): 400 CAPSULE ORAL at 14:38

## 2019-03-17 RX ADMIN — Medication 81 MILLIGRAM(S): at 13:50

## 2019-03-17 RX ADMIN — INSULIN GLARGINE 5 UNIT(S): 100 INJECTION, SOLUTION SUBCUTANEOUS at 09:20

## 2019-03-17 RX ADMIN — LEVETIRACETAM 250 MILLIGRAM(S): 250 TABLET, FILM COATED ORAL at 18:30

## 2019-03-17 RX ADMIN — PANTOPRAZOLE SODIUM 40 MILLIGRAM(S): 20 TABLET, DELAYED RELEASE ORAL at 05:44

## 2019-03-18 LAB
ANION GAP SERPL CALC-SCNC: 12 MMOL/L — SIGNIFICANT CHANGE UP (ref 7–14)
BUN SERPL-MCNC: 14 MG/DL — SIGNIFICANT CHANGE UP (ref 10–20)
CALCIUM SERPL-MCNC: 9.5 MG/DL — SIGNIFICANT CHANGE UP (ref 8.5–10.1)
CHLORIDE SERPL-SCNC: 101 MMOL/L — SIGNIFICANT CHANGE UP (ref 98–110)
CO2 SERPL-SCNC: 26 MMOL/L — SIGNIFICANT CHANGE UP (ref 17–32)
CREAT SERPL-MCNC: 0.7 MG/DL — SIGNIFICANT CHANGE UP (ref 0.7–1.5)
GLUCOSE BLDC GLUCOMTR-MCNC: 102 MG/DL — HIGH (ref 70–99)
GLUCOSE BLDC GLUCOMTR-MCNC: 139 MG/DL — HIGH (ref 70–99)
GLUCOSE BLDC GLUCOMTR-MCNC: 150 MG/DL — HIGH (ref 70–99)
GLUCOSE BLDC GLUCOMTR-MCNC: 216 MG/DL — HIGH (ref 70–99)
GLUCOSE BLDC GLUCOMTR-MCNC: 74 MG/DL — SIGNIFICANT CHANGE UP (ref 70–99)
GLUCOSE SERPL-MCNC: 189 MG/DL — HIGH (ref 70–99)
HCT VFR BLD CALC: 40.5 % — SIGNIFICANT CHANGE UP (ref 37–47)
HGB BLD-MCNC: 13.8 G/DL — SIGNIFICANT CHANGE UP (ref 12–16)
MCHC RBC-ENTMCNC: 29.6 PG — SIGNIFICANT CHANGE UP (ref 27–31)
MCHC RBC-ENTMCNC: 34.1 G/DL — SIGNIFICANT CHANGE UP (ref 32–37)
MCV RBC AUTO: 86.9 FL — SIGNIFICANT CHANGE UP (ref 81–99)
NRBC # BLD: 0 /100 WBCS — SIGNIFICANT CHANGE UP (ref 0–0)
PLATELET # BLD AUTO: 248 K/UL — SIGNIFICANT CHANGE UP (ref 130–400)
POTASSIUM SERPL-MCNC: 3.7 MMOL/L — SIGNIFICANT CHANGE UP (ref 3.5–5)
POTASSIUM SERPL-SCNC: 3.7 MMOL/L — SIGNIFICANT CHANGE UP (ref 3.5–5)
RBC # BLD: 4.66 M/UL — SIGNIFICANT CHANGE UP (ref 4.2–5.4)
RBC # FLD: 12 % — SIGNIFICANT CHANGE UP (ref 11.5–14.5)
SODIUM SERPL-SCNC: 139 MMOL/L — SIGNIFICANT CHANGE UP (ref 135–146)
WBC # BLD: 5.93 K/UL — SIGNIFICANT CHANGE UP (ref 4.8–10.8)
WBC # FLD AUTO: 5.93 K/UL — SIGNIFICANT CHANGE UP (ref 4.8–10.8)

## 2019-03-18 RX ORDER — NYSTATIN CREAM 100000 [USP'U]/G
1 CREAM TOPICAL
Qty: 0 | Refills: 0 | Status: DISCONTINUED | OUTPATIENT
Start: 2019-03-18 | End: 2019-03-18

## 2019-03-18 RX ADMIN — PANTOPRAZOLE SODIUM 40 MILLIGRAM(S): 20 TABLET, DELAYED RELEASE ORAL at 05:53

## 2019-03-18 RX ADMIN — ENOXAPARIN SODIUM 40 MILLIGRAM(S): 100 INJECTION SUBCUTANEOUS at 05:55

## 2019-03-18 RX ADMIN — Medication 5 UNIT(S): at 12:49

## 2019-03-18 RX ADMIN — GABAPENTIN 300 MILLIGRAM(S): 400 CAPSULE ORAL at 21:37

## 2019-03-18 RX ADMIN — Medication 81 MILLIGRAM(S): at 12:53

## 2019-03-18 RX ADMIN — GABAPENTIN 300 MILLIGRAM(S): 400 CAPSULE ORAL at 17:39

## 2019-03-18 RX ADMIN — LEVETIRACETAM 250 MILLIGRAM(S): 250 TABLET, FILM COATED ORAL at 05:54

## 2019-03-18 RX ADMIN — GABAPENTIN 300 MILLIGRAM(S): 400 CAPSULE ORAL at 05:53

## 2019-03-18 RX ADMIN — LEVETIRACETAM 250 MILLIGRAM(S): 250 TABLET, FILM COATED ORAL at 17:40

## 2019-03-18 RX ADMIN — Medication 5 UNIT(S): at 17:43

## 2019-03-18 RX ADMIN — CHLORHEXIDINE GLUCONATE 1 APPLICATION(S): 213 SOLUTION TOPICAL at 17:39

## 2019-03-18 RX ADMIN — Medication 5 UNIT(S): at 08:39

## 2019-03-18 RX ADMIN — ATORVASTATIN CALCIUM 40 MILLIGRAM(S): 80 TABLET, FILM COATED ORAL at 21:37

## 2019-03-18 RX ADMIN — INSULIN GLARGINE 5 UNIT(S): 100 INJECTION, SOLUTION SUBCUTANEOUS at 08:42

## 2019-03-18 NOTE — SWALLOW BEDSIDE ASSESSMENT ADULT - SLP PERTINENT HISTORY OF CURRENT PROBLEM
Pt admitted with slurred speech, R sided weakness, difficulty swallowing for 3 months. 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 ALS
Pt admitted with slurred speech, R sided weakness, difficulty swallowing for 3 months. CTH (-), await MRI. R/O CVA

## 2019-03-18 NOTE — SWALLOW BEDSIDE ASSESSMENT ADULT - ORAL PHASE
Delayed oral transit time/uses head back to assist w/ posterior oral transport Delayed oral transit time

## 2019-03-18 NOTE — CONSULT NOTE ADULT - SUBJECTIVE AND OBJECTIVE BOX
HPI:  79 y.o. Portuguese-speaking F with PMh of GERD, DM was sent to the ED by her PMD for the right-sided droop, slurred speech and the weakness fo three months. She is unable to ambulate, difficulty swallowing, tongue heaviness. The symptoms were getting worse for the past three days that was noticed by the physical therapist. Actually patients states that this has been going on from last three months.        She was seen by the neurologist outside. She is sent here to the ED for the stroke workup. In the ED patient had CT scan and it showed no intracranial pathology. Daughter will bring all her medications in the morning. (16 Mar 2019 02:29)      PAST MEDICAL & SURGICAL HISTORY:  High cholesterol  HTN (hypertension)  GERD (gastroesophageal reflux disease)  DM (diabetes mellitus)  H/O appendicitis  S/P cholecystectomy  H/O hernia repair      Hospital Course:  Seen by neurology. Exam suggests upper motor neuron lesion, R/O ALS. MRI ordered.    TODAY'S SUBJECTIVE & REVIEW OF SYMPTOMS:     Constitutional WNL   Cardio WNL   Resp WNL   GI WNL  Heme WNL  Endo WNL  Skin WNL  MSK WNL  Neuro +RLE pain  Cognitive WNL  Psych WNL      MEDICATIONS  (STANDING):  aspirin  chewable 81 milliGRAM(s) Oral daily  atorvastatin 40 milliGRAM(s) Oral at bedtime  chlorhexidine 4% Liquid 1 Application(s) Topical two times a day  dextrose 5%. 1000 milliLiter(s) (50 mL/Hr) IV Continuous <Continuous>  dextrose 50% Injectable 12.5 Gram(s) IV Push once  dextrose 50% Injectable 25 Gram(s) IV Push once  dextrose 50% Injectable 25 Gram(s) IV Push once  enoxaparin Injectable 40 milliGRAM(s) SubCutaneous every 24 hours  gabapentin 300 milliGRAM(s) Oral three times a day  hydrochlorothiazide 25 milliGRAM(s) Oral daily  insulin glargine Injectable (LANTUS) 5 Unit(s) SubCutaneous every morning  insulin lispro (HumaLOG) corrective regimen sliding scale   SubCutaneous three times a day before meals  insulin lispro Injectable (HumaLOG) 5 Unit(s) SubCutaneous three times a day before meals  levETIRAcetam 250 milliGRAM(s) Oral two times a day  nystatin Cream 1 Application(s) Topical two times a day  pantoprazole    Tablet 40 milliGRAM(s) Oral before breakfast    MEDICATIONS  (PRN):  dextrose 40% Gel 15 Gram(s) Oral once PRN Blood Glucose LESS THAN 70 milliGRAM(s)/deciliter  glucagon  Injectable 1 milliGRAM(s) IntraMuscular once PRN Glucose LESS THAN 70 milligrams/deciliter      FAMILY HISTORY:  No pertinent family history in first degree relatives      Allergies    No Known Allergies    Intolerances        SOCIAL HISTORY:    [  ] EtOH  [  ] Smoking  [  ] Substance abuse     Home Environment:  [  ] Home Alone  [ X ] Lives with Family--daughter  [  ] Home Health Aide    Dwelling:  [ X ] Apartment  [  ] Private House  [  ] Adult Home  [  ] Skilled Nursing Facility      [  ] Short Term  [  ] Long Term  [ X ] Stairs one flight entry      Elevator [  ]    FUNCTIONAL STATUS PTA: (Check all that apply)  Ambulation: [ X  ]Independent    [  ] Dependent     [  ] Non-Ambulatory  Assistive Device: [  ] SA Cane  [  ]  Q Cane  [  ] Walker  [  ]  Wheelchair  ADL : [ X ] Independent  [  ]  Dependent       Vital Signs Last 24 Hrs  T(C): 36.4 (18 Mar 2019 05:38), Max: 36.4 (18 Mar 2019 05:38)  T(F): 97.6 (18 Mar 2019 05:38), Max: 97.6 (18 Mar 2019 05:38)  HR: 67 (18 Mar 2019 05:38) (67 - 82)  BP: 93/54 (18 Mar 2019 08:47) (93/54 - 113/64)  BP(mean): --  RR: 18 (18 Mar 2019 08:47) (18 - 18)  SpO2: 94% (18 Mar 2019 08:47) (94% - 94%)      PHYSICAL EXAM: Alert & Oriented X3  GENERAL: NAD, well-groomed, well-developed  HEAD:  Atraumatic, Normocephalic  EYES: EOMI, PERRLA, conjunctiva and sclera clear  NECK: Supple, No JVD, Normal thyroid  CHEST/LUNG: Clear to percussion bilaterally; No rales, rhonchi, wheezing, or rubs  HEART: Regular rate and rhythm; No murmurs, rubs, or gallops  ABDOMEN: Soft, Nontender, Nondistended; Bowel sounds present  EXTREMITIES:  2+ Peripheral Pulses, No clubbing, cyanosis, or edema    NERVOUS SYSTEM:  Cranial Nerves 2-12 intact [  ] Abnormal  [X  ] +dysarthria  ROM: WFL all extremities [X  ]  Abnormal [  ]  Motor Strength: WFL all extremities  [  ]  Abnormal [X  ]  Sensation: intact to light touch [  ] Abnormal [  ]  Reflexes: Symmetric [  ]  Abnormal [  ]    FUNCTIONAL STATUS:  Bed Mobility: Independent [  ]  Supervision [ X ]  Needs Assistance [  ]  N/A [  ]  Transfers: Independent [  ]  Supervision [  ]  Needs Assistance [X  ]  N/A [  ]   Ambulation: Independent [  ]  Supervision [  ]  Needs Assistance [X  ]  N/A [  ]  ADL: Independent [  ] Requires Assistance [X  ] N/A [  ]      LABS:                        13.8   5.93  )-----------( 248      ( 18 Mar 2019 09:04 )             40.5     03-18    139  |  101  |  14  ----------------------------<  189<H>  3.7   |  26  |  0.7    Ca    9.5      18 Mar 2019 09:04            RADIOLOGY & ADDITIONAL STUDIES:      EXAM:  MR BRAIN            PROCEDURE DATE:  03/16/2019            INTERPRETATION:  Clinical History / Reason for exam: Right-sided facial   weakness    TECHNIQUE: MRI brain without contrast. Multiplanar multisequential MRI of   the brain without intravenous contrast administration on the 1.5 Carmen   magnet.     Correlation made with CT head 3/14/2019    FINDINGS:    The ventricles and cortical sulci are normal in size and configuration.      There are multiple punctate foci of T2/FLAIR signal hyperintensity within   the cerebral hemispheric white matter consistent with chronic likely   vascular change.    There is no evidence of acute intracranial hemorrhage, extra-axial fluid   collection or midline shift.       There is no diffusion abnormality to suggest acute/subacute infarct.   There is normal flow void present within the major vascular structures.      The orbits, sinuses, and mastoid complexes are unremarkable.     IMPRESSION:     1.  No evidence of recent infarct or acute intracranial hemorrhage.    2.  Mild chronic microvascular changes.      EXAM:  CAROTID DUPLEX BILATERAL            PROCEDURE DATE:  03/16/2019            INTERPRETATION:  Clinical History / Reason for exam: The patient is   79-year-old female with right-sided facial weakness.  The study was   performed to evaluate the patient for carotid artery stenosis.    Duplex evaluation of the carotid arteries was performed bilaterally.  In the right carotid system, the internal carotid artery is noted to be   patent with a peak systolic velocity of 65 cm/sec over end diastolic   velocity of 17 cm/sec.    In the left carotid system, the internal carotid artery is noted to be   patent with a peak systolic velocity of 101 cm/sec over end diastolic   velocity of 27 cm/sec.    The right vertebral arterial flow was antegrade.    The left vertebral arterial flow was antegrade.    Impression:    20-39% stenosis of the right internal carotid artery.    20-39% stenosis of the left internal carotid artery.        EXAM:  CT BRAIN            PROCEDURE DATE:  03/15/2019            INTERPRETATION:  Clinical History / Reason for exam: Slurred speech    Technique: Non-contrast multiaxial CT scan of the head was performed from   base of the skull to the vertex.     Comparison: None    Findings:    The ventricular system, basal cisterns sulcal pattern are appropriate for   patients age.    There is no evidence of intra or extra-axial hemorrhage or fluid   collection.  No mass effect or midline shift is seen.     The gray-white matter differentiation is preserved.  Mild periventricular   and subcortical white matter hypodensities are noted without mass effect   compatible with microvascular ischemic changes.     There is no calvarial fracture.    Visualized paranasal sinuses and mastoid air cells are within normal   limits.    Impression:    No acute intracranial abnormality.      EXAM:  XR CHEST PORTABLE URGENT 1V            PROCEDURE DATE:  03/15/2019            INTERPRETATION:  Clinical History / Reason for exam: Stroke code.    Comparison : Chest radiograph of 12/14/2018.    Technique/Positioning: Frontal view of the chest.    Findings:    Support devices: None.    Cardiac/mediastinum/hilum: Mildly tortuous aorta. Mildly enlarged cardiac   silhouette.    Lung parenchyma/Pleura: Within normal limits.    Skeleton/soft tissues: Unchanged.    Impression:      No radiographic evidence of acute cardiopulmonary disease.

## 2019-03-18 NOTE — SWALLOW BEDSIDE ASSESSMENT ADULT - SWALLOW EVAL: DIAGNOSIS
Moderate oral dysphagia, no s/s aspiration/penetration for puree and nectar thick liquids
+mild oral dysphagia w/o overt s/s of penetration/aspiration w/ puree and nectar thick liquids

## 2019-03-18 NOTE — SWALLOW BEDSIDE ASSESSMENT ADULT - DIET PRIOR TO ADMI
Regular with thin liquids, family reports choking at home during meals
Regular with thin liquids, family reports choking at home during meals

## 2019-03-18 NOTE — PROGRESS NOTE ADULT - SUBJECTIVE AND OBJECTIVE BOX
Neurology Progress Note    Interval History:      Patient examined in f/u for question of ALS.    Vital Signs Last 24 Hrs  T(C): 35.6 (18 Mar 2019 20:00), Max: 36.4 (18 Mar 2019 05:38)  T(F): 96.1 (18 Mar 2019 20:00), Max: 97.6 (18 Mar 2019 05:38)  HR: 83 (18 Mar 2019 20:00) (67 - 84)  BP: 114/58 (18 Mar 2019 20:00) (93/54 - 131/106)  BP(mean): --  RR: 18 (18 Mar 2019 20:00) (18 - 18)  SpO2: 94% (18 Mar 2019 08:47) (94% - 94%)    Neurological Exam:   Severe dysarthria present.  She can protrude tongue but seems to be weak and there may be fasciculations  present.   General reflex exam shows diffusely increased reflexes and relatively normal sensation.  Jaw jerk is increased.  Crossed adductor responses were present.  Normal LT and vibration.      Labs:  CBC Full  -  ( 18 Mar 2019 09:04 )  WBC Count : 5.93 K/uL  Hemoglobin : 13.8 g/dL  Hematocrit : 40.5 %  Platelet Count - Automated : 248 K/uL  Mean Cell Volume : 86.9 fL  Mean Cell Hemoglobin : 29.6 pg  Mean Cell Hemoglobin Concentration : 34.1 g/dL  Auto Neutrophil # : x  Auto Lymphocyte # : x  Auto Monocyte # : x  Auto Eosinophil # : x  Auto Basophil # : x  Auto Neutrophil % : x  Auto Lymphocyte % : x  Auto Monocyte % : x  Auto Eosinophil % : x  Auto Basophil % : x    03-18    139  |  101  |  14  ----------------------------<  189<H>  3.7   |  26  |  0.7    Ca    9.5      18 Mar 2019 09:04    MRI brain no acute infarct, mild microvascular changes.  MRI cervical spine appears w/o significant disease, though final result is pending.    Assessment:  This is a 79y Female w/ h/o progressive dysarthria and diffusely increase reflexes.  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    Plan:  #1.  Obtain baseline CBC and LFT's.  #2.  Offer treatment with riluzole 50 mg po bid.  #3.   Aspiration precautions.  #4.   Outpatient f/u in [facility] Neuromuscular clinic in 4-6 weeks.  #5.   I counselled patient and her son regarding diagnosis.

## 2019-03-18 NOTE — PROGRESS NOTE ADULT - ASSESSMENT
Rt facial droop, weakness, dysphagia  - CVA vs progressive neurological cause - per Neuro could be UMN  - w/u currently pending per Neuro; pending disposition  - PT f/u pending  - f/u other testing  - appreciate Speech and swallow eval and f/u    Continue all other meds and management for current conditions including DM    Will follow.  Need Neuro and PT evals completed before DC planning can be finalized. Pt does look and feel much better today

## 2019-03-18 NOTE — PROGRESS NOTE ADULT - SUBJECTIVE AND OBJECTIVE BOX
78 yo woman known to us, GERD, DM, last three months apparently had been having slowly worsening ambulation, dysphagia, and rt-sided facial droop with slurred speech.  Sent in bc PT noticed worsening sxs.  CT head unremarkable; MRI pending.  PT pending.  Neuro consult appreciated - concerned for ALS and other upper motor neuron diagnoses, amongst others.  CVA still a possibility.    PE deferred as pt was en route to MRI this evening.  Per prior notes Neuro exam noted and corroborated with above initial sxs.  Overall seemed to be stable. SINDY MAST  79y  Female    Complaints:   80 yo woman known to us, GERD, DM, last three months apparently had been having slowly worsening ambulation, dysphagia, and rt-sided facial droop with slurred speech.  Sent in bc PT noticed worsening sxs.    Subjective:   Overnight, patient has no new complaint. Awake, alert, oriented. Afebrile.  Still with slurred speech.  Complaining of right thigh pain today.  Pending MRI of cervical spine    Vital Signs Last 24 Hrs  T(C): 35.6 (18 Mar 2019 14:21), Max: 36.4 (18 Mar 2019 05:38)  T(F): 96 (18 Mar 2019 14:21), Max: 97.6 (18 Mar 2019 05:38)  HR: 84 (18 Mar 2019 14:21) (67 - 84)  BP: 131/106 (18 Mar 2019 14:21) (93/54 - 131/106)  BP(mean): --  RR: 18 (18 Mar 2019 14:21) (18 - 18)  SpO2: 94% (18 Mar 2019 08:47) (94% - 94%)    PHYSICAL EXAM:  GENERAL: NAD, well-groomed, well-developed  HEENT  Moist mucous membranes, Good dentition, No lesions  NECK: Supple, No JVD  CHEST/LUNG: Clear to auscultation bilaterally; No rales, rhonchi, wheezing  HEART: Regular rate and rhythm; No murmurs, rubs, or gallops  ABDOMEN: Soft, Nontender, Nondistended; Bowel sounds present  EXTREMITIES:  2+ Peripheral Pulses, No clubbing, cyanosis, or edema  NEURO:  dysarthria  SKIN: No rashes or lesions    Consultant(s) Notes Reviewed:  [x ] YES  [ ] NO  Care Discussed with Consultants/Other Providers [ x] YES  [ ] NO        Imaging Personally Reviewed:  [ ] YES  [ ] NO  MedsMEDICATIONS  (STANDING):  aspirin  chewable 81 milliGRAM(s) Oral daily  atorvastatin 40 milliGRAM(s) Oral at bedtime  chlorhexidine 4% Liquid 1 Application(s) Topical two times a day  dextrose 5%. 1000 milliLiter(s) (50 mL/Hr) IV Continuous <Continuous>  dextrose 50% Injectable 12.5 Gram(s) IV Push once  dextrose 50% Injectable 25 Gram(s) IV Push once  dextrose 50% Injectable 25 Gram(s) IV Push once  enoxaparin Injectable 40 milliGRAM(s) SubCutaneous every 24 hours  gabapentin 300 milliGRAM(s) Oral three times a day  hydrochlorothiazide 25 milliGRAM(s) Oral daily  insulin glargine Injectable (LANTUS) 5 Unit(s) SubCutaneous every morning  insulin lispro (HumaLOG) corrective regimen sliding scale   SubCutaneous three times a day before meals  insulin lispro Injectable (HumaLOG) 5 Unit(s) SubCutaneous three times a day before meals  levETIRAcetam 250 milliGRAM(s) Oral two times a day  nystatin Cream 1 Application(s) Topical two times a day  pantoprazole    Tablet 40 milliGRAM(s) Oral before breakfast    MEDICATIONS  (PRN):  dextrose 40% Gel 15 Gram(s) Oral once PRN Blood Glucose LESS THAN 70 milliGRAM(s)/deciliter  glucagon  Injectable 1 milliGRAM(s) IntraMuscular once PRN Glucose LESS THAN 70 milligrams/deciliter

## 2019-03-18 NOTE — PROGRESS NOTE ADULT - ASSESSMENT
Rt facial droop, weakness, dysphagia  - CVA vs progressive neurological cause - per Neuro could be UMN  - w/u currently pending per Neuro  - PT  - f/u MRI    Will follow 78 yo female with pmh as stated above presents with 3 month history of progressive muscular weakness, dysphagia and dysarthria.    # Dysphagia, dysarthria  - As per Neuro: exam is suggestive of UMN lesion./possible ALS  - MRI of brain was within normal limits.  - US doppler of both carotid arteries showed 20-39% stenosis.  - MRI of cervical spine today  - If all imaging negative, neuro planning EMG as outpatient   - On aspirin, atorvastatin, and lisinopril   - Possible discharge tomorrow after PT and speech eval today

## 2019-03-18 NOTE — PROGRESS NOTE ADULT - SUBJECTIVE AND OBJECTIVE BOX
Pt stable, status unchanged.  PT f/u appreciated, Await Neuro workup and disposition.    PE VSS  HEENT WNL  Lungs CTA  Heart RRR s1s2  Abd benign  Ext no c/c/e  Neuro gait weakness noted

## 2019-03-18 NOTE — SWALLOW BEDSIDE ASSESSMENT ADULT - SLP GENERAL OBSERVATIONS
Pt awake, no c/o pain. moderate-severe dysarthria.
Pt received in bed awake and alert on room air w/ son at b/s. [place] could not report recent events pertaining to his mothers health

## 2019-03-18 NOTE — CONSULT NOTE ADULT - ASSESSMENT
80 yo female with pmh as stated above presents with 3 month history of progressive muscular weakness, dysphagia and dysarthria. Exam is suggestive of UMN lesion./possible ALS    Plan:  MRI brain, Cspine NC  Speech and Swallow test  PT/OT/rehab  Continue current medical management      Neuroattending note will follow
IMPRESSION: Rehab for gait disorder, progressive muscular weakness and slurring of speech, r/o ALS    PRECAUTIONS: [  ] Cardiac  [  ] Respiratory  [  ] Seizures [  ] Contact Isolation  [  ] Droplet Isolation  [  ] Other    Weight Bearing Status:  WBAT    RECOMMENDATION:    Out of Bed to Chair     DVT/Decubiti Prophylaxis    REHAB PLAN:     [  X ] Bedside P/T 3-5 times a week   [ X  ]   Bedside O/T  2-3 times a week             [   ] No Rehab Therapy Indicated                   [   ]  Speech Therapy   Conditioning/ROM                                    ADL  Bed Mobility                                               Conditioning/ROM  Transfers                                                     Bed Mobility  Sitting /Standing Balance                         Transfers                                        Gait Training                                               Sitting/Standing Balance  Stair Training [ X  ]Applicable                    Home equipment Eval                                                                        Splinting  [   ] Only      GOALS:   ADL   [ X  ]   Independent                    Transfers  [  X ] Independent                          Ambulation  [  X ] Independent     [  X  ] With device                            [   ]  CG                                                         [   ]  CG                                                                  [   ] CG                            [    ] Min A                                                   [   ] Min A                                                              [   ] Min  A          DISCHARGE PLAN:   [ X  ]  Good candidate for Intensive Rehabilitation/Hospital based-4A [facility]                                             Will tolerate 3hrs Intensive Rehab Daily                                       [    ]  Short Term Rehab in Skilled Nursing Facility                                       [    ]  Home with Outpatient or VN services                                         [    ]  Possible Candidate for Intensive Hospital based Rehab      Thank you.

## 2019-03-18 NOTE — PHYSICAL THERAPY INITIAL EVALUATION ADULT - GENERAL OBSERVATIONS, REHAB EVAL
100-120 pm Chart reviewed. Pt. seen semirecline in bed, in No apparent distress, +slurred speech, Angolan-speaking; with son at bedside, denies pain, agreed to therapy

## 2019-03-18 NOTE — PROGRESS NOTE ADULT - SUBJECTIVE AND OBJECTIVE BOX
Pt stable.  Feels better.  MRI neg but rest of w/u, neuro f/u and PT f/u pending.    HEENT WNL speech still slurred  Lungs CTA  Heart RRR s1s2  Abd benign  Ext no c/c/e  Neuro gait unassessed but per nursing gait is weak, only able to demonstrate walking over short distances

## 2019-03-18 NOTE — SWALLOW BEDSIDE ASSESSMENT ADULT - ASR SWALLOW ASPIRATION MONITOR
fever/pneumonia/gurgly voice/upper respiratory infection/oral hygiene/position upright (90Y)/throat clearing/change of breathing pattern/cough

## 2019-03-18 NOTE — SWALLOW BEDSIDE ASSESSMENT ADULT - SWALLOW EVAL: RECOMMENDED FEEDING/EATING TECHNIQUES
small sips/bites
small sips/bites/maintain upright posture during/after eating for 30 mins/position upright (90 degrees)

## 2019-03-18 NOTE — OCCUPATIONAL THERAPY INITIAL EVALUATION ADULT - PLANNED THERAPY INTERVENTIONS, OT EVAL
bed mobility training/parent/caregiver training.../strengthening/transfer training/balance training/ADL retraining

## 2019-03-18 NOTE — PROGRESS NOTE ADULT - ASSESSMENT
Rt facial droop, weakness, dysphagia  - CVA vs progressive neurological cause - per Neuro could be UMN; MRI c-spine pending  - w/u currently pending per Neuro; pending disposition by Neuro  - PT f/u appreciated; also appreciate Speech and swallow eval and f/u.  Need to discuss with family and case mgmt appropriate disposition (home vs SNF)     Continue all other meds and management for current conditions including DM    Will follow.  Need Neuro and PT disposition completed before DC planning can be finalized. Pt does look and feel much better today

## 2019-03-18 NOTE — SWALLOW BEDSIDE ASSESSMENT ADULT - NS SPL SWALLOW CLINIC TRIAL FT
No s/s aspiration/penetration. Soft and thin liquids not trialed 2' oral weakness
mild oral dysphagia w/o overt s/s of penetration/aspiration w/ puree

## 2019-03-19 ENCOUNTER — TRANSCRIPTION ENCOUNTER (OUTPATIENT)
Age: 79
End: 2019-03-19

## 2019-03-19 VITALS — DIASTOLIC BLOOD PRESSURE: 70 MMHG | TEMPERATURE: 97 F | HEART RATE: 82 BPM | SYSTOLIC BLOOD PRESSURE: 117 MMHG

## 2019-03-19 LAB
ANION GAP SERPL CALC-SCNC: 12 MMOL/L — SIGNIFICANT CHANGE UP (ref 7–14)
BASOPHILS # BLD AUTO: 0.02 K/UL — SIGNIFICANT CHANGE UP (ref 0–0.2)
BASOPHILS NFR BLD AUTO: 0.4 % — SIGNIFICANT CHANGE UP (ref 0–1)
BUN SERPL-MCNC: 17 MG/DL — SIGNIFICANT CHANGE UP (ref 10–20)
CALCIUM SERPL-MCNC: 9.5 MG/DL — SIGNIFICANT CHANGE UP (ref 8.5–10.1)
CHLORIDE SERPL-SCNC: 105 MMOL/L — SIGNIFICANT CHANGE UP (ref 98–110)
CO2 SERPL-SCNC: 27 MMOL/L — SIGNIFICANT CHANGE UP (ref 17–32)
CREAT SERPL-MCNC: 0.7 MG/DL — SIGNIFICANT CHANGE UP (ref 0.7–1.5)
EOSINOPHIL # BLD AUTO: 0.1 K/UL — SIGNIFICANT CHANGE UP (ref 0–0.7)
EOSINOPHIL NFR BLD AUTO: 1.8 % — SIGNIFICANT CHANGE UP (ref 0–8)
GLUCOSE BLDC GLUCOMTR-MCNC: 124 MG/DL — HIGH (ref 70–99)
GLUCOSE BLDC GLUCOMTR-MCNC: 201 MG/DL — HIGH (ref 70–99)
GLUCOSE SERPL-MCNC: 113 MG/DL — HIGH (ref 70–99)
HCT VFR BLD CALC: 40.3 % — SIGNIFICANT CHANGE UP (ref 37–47)
HGB BLD-MCNC: 13.6 G/DL — SIGNIFICANT CHANGE UP (ref 12–16)
IMM GRANULOCYTES NFR BLD AUTO: 0.4 % — HIGH (ref 0.1–0.3)
LYMPHOCYTES # BLD AUTO: 1.87 K/UL — SIGNIFICANT CHANGE UP (ref 1.2–3.4)
LYMPHOCYTES # BLD AUTO: 33.9 % — SIGNIFICANT CHANGE UP (ref 20.5–51.1)
MCHC RBC-ENTMCNC: 29.8 PG — SIGNIFICANT CHANGE UP (ref 27–31)
MCHC RBC-ENTMCNC: 33.7 G/DL — SIGNIFICANT CHANGE UP (ref 32–37)
MCV RBC AUTO: 88.2 FL — SIGNIFICANT CHANGE UP (ref 81–99)
MONOCYTES # BLD AUTO: 0.54 K/UL — SIGNIFICANT CHANGE UP (ref 0.1–0.6)
MONOCYTES NFR BLD AUTO: 9.8 % — HIGH (ref 1.7–9.3)
NEUTROPHILS # BLD AUTO: 2.96 K/UL — SIGNIFICANT CHANGE UP (ref 1.4–6.5)
NEUTROPHILS NFR BLD AUTO: 53.7 % — SIGNIFICANT CHANGE UP (ref 42.2–75.2)
NRBC # BLD: 0 /100 WBCS — SIGNIFICANT CHANGE UP (ref 0–0)
PLATELET # BLD AUTO: 33 K/UL — LOW (ref 130–400)
POTASSIUM SERPL-MCNC: 4 MMOL/L — SIGNIFICANT CHANGE UP (ref 3.5–5)
POTASSIUM SERPL-SCNC: 4 MMOL/L — SIGNIFICANT CHANGE UP (ref 3.5–5)
RBC # BLD: 4.57 M/UL — SIGNIFICANT CHANGE UP (ref 4.2–5.4)
RBC # FLD: 12.1 % — SIGNIFICANT CHANGE UP (ref 11.5–14.5)
SODIUM SERPL-SCNC: 144 MMOL/L — SIGNIFICANT CHANGE UP (ref 135–146)
WBC # BLD: 5.51 K/UL — SIGNIFICANT CHANGE UP (ref 4.8–10.8)
WBC # FLD AUTO: 5.51 K/UL — SIGNIFICANT CHANGE UP (ref 4.8–10.8)

## 2019-03-19 RX ORDER — RILUZOLE 50 MG/1
1 TABLET ORAL
Qty: 60 | Refills: 0 | OUTPATIENT
Start: 2019-03-19 | End: 2019-04-17

## 2019-03-19 RX ORDER — ATORVASTATIN CALCIUM 80 MG/1
1 TABLET, FILM COATED ORAL
Qty: 30 | Refills: 0 | OUTPATIENT
Start: 2019-03-19 | End: 2019-04-17

## 2019-03-19 RX ADMIN — Medication 2: at 12:21

## 2019-03-19 RX ADMIN — LEVETIRACETAM 250 MILLIGRAM(S): 250 TABLET, FILM COATED ORAL at 17:52

## 2019-03-19 RX ADMIN — GABAPENTIN 300 MILLIGRAM(S): 400 CAPSULE ORAL at 06:26

## 2019-03-19 RX ADMIN — LEVETIRACETAM 250 MILLIGRAM(S): 250 TABLET, FILM COATED ORAL at 06:26

## 2019-03-19 RX ADMIN — Medication 25 MILLIGRAM(S): at 06:26

## 2019-03-19 RX ADMIN — Medication 5 UNIT(S): at 12:20

## 2019-03-19 RX ADMIN — GABAPENTIN 300 MILLIGRAM(S): 400 CAPSULE ORAL at 17:51

## 2019-03-19 RX ADMIN — PANTOPRAZOLE SODIUM 40 MILLIGRAM(S): 20 TABLET, DELAYED RELEASE ORAL at 06:27

## 2019-03-19 RX ADMIN — Medication 5 UNIT(S): at 17:52

## 2019-03-19 RX ADMIN — CHLORHEXIDINE GLUCONATE 1 APPLICATION(S): 213 SOLUTION TOPICAL at 17:52

## 2019-03-19 RX ADMIN — ENOXAPARIN SODIUM 40 MILLIGRAM(S): 100 INJECTION SUBCUTANEOUS at 06:26

## 2019-03-19 RX ADMIN — Medication 81 MILLIGRAM(S): at 12:19

## 2019-03-19 RX ADMIN — INSULIN GLARGINE 5 UNIT(S): 100 INJECTION, SOLUTION SUBCUTANEOUS at 12:18

## 2019-03-19 NOTE — DISCHARGE NOTE PROVIDER - CARE PROVIDER_API CALL
Adelfo Moore)  Neurology  [address] [place] [address], [address]  [place] [address]  Phone: [phone]  Fax: [phone]  Follow Up Time:     Raphael Redd)  Diagnostic Radiology; Neuroradiology  [address] [facility], [address]  [place] [address]  Phone: [phone]  Fax: [phone]  Follow Up Time:

## 2019-03-19 NOTE — PROGRESS NOTE ADULT - SUBJECTIVE AND OBJECTIVE BOX
Pt stable.  Per Neuro MRI c-spine neg, pt though to be started on riluzole for presumptive working diagnosis of ALS.  Outpt Neuro w/u planned.    Originally pt had actually been approved for 4A rehab, but family refused - they would rather do PT at home.  PT therefore was arranged at home, and pt scheduled to be DC'd today.    PE VSS  HEENT WNL  Lungs CTA  Heart RRR s1s2  Abd benign  Ext no c/c/e  Neuro still with slurred speech, weakness and exam essentially unchanged

## 2019-03-19 NOTE — PROGRESS NOTE ADULT - ASSESSMENT
Rt facial droop, weakness, dysphagia  - MRI brain and c-spine unremarkable; working diagnosis is ALS vs other UMN lesion  - Outpt workup planned by Neuro  - PT f/u appreciated; also appreciate Speech and swallow eval and f/u.  Plan is per family's request for home PT which has been arranged.  - to start riluzole (will need to authorize)    Continue all other meds and management for current conditions including DM    Will follow as outpt within 3-5 days.  Outpt Neuro and PT.

## 2019-03-19 NOTE — PROGRESS NOTE ADULT - REASON FOR ADMISSION
Slurred speech and right sided weakness

## 2019-03-19 NOTE — PROGRESS NOTE ADULT - ATTENDING COMMENTS
I am the attg for this pt and my notes are above.
I am the attg for this pt today and my notes are above.

## 2019-03-19 NOTE — PROGRESS NOTE ADULT - NSHPATTENDINGPLANDISCUSS_GEN_ALL_CORE
pt, family (multiple members); spoke to them in Grenadian
house team
pt, daughter (spoke to them in English); house team
pt, family, house team

## 2019-03-19 NOTE — DISCHARGE NOTE NURSING/CASE MANAGEMENT/SOCIAL WORK - NSDCDPATPORTLINK_GEN_ALL_CORE
You can access the [facility] Patient Portal, offered by [facility], by registering with the following website: [email or web address]

## 2019-03-19 NOTE — DISCHARGE NOTE PROVIDER - HOSPITAL COURSE
79y Female w/ h/o progressive dysarthria and diffusely increase reflexes.  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    Obtained baseline CBC and LFT's to start treatment with riluzole 50 mg po bid.    MRI of brain was within normal limits.    US doppler of both carotid arteries showed 20-39% stenosis.    MRI of cervical spine wnl    Neuro planning EMG as outpatient     Outpatient f/u in [facility] Neuromuscular clinic in 4-6 weeks.    Pt counselled patient and her son regarding diagnosis.

## 2019-03-22 DIAGNOSIS — R29.810 FACIAL WEAKNESS: ICD-10-CM

## 2019-03-22 DIAGNOSIS — G12.21 AMYOTROPHIC LATERAL SCLEROSIS: ICD-10-CM

## 2019-03-22 DIAGNOSIS — R13.10 DYSPHAGIA, UNSPECIFIED: ICD-10-CM

## 2019-03-22 DIAGNOSIS — E78.00 PURE HYPERCHOLESTEROLEMIA, UNSPECIFIED: ICD-10-CM

## 2019-03-22 DIAGNOSIS — R47.1 DYSARTHRIA AND ANARTHRIA: ICD-10-CM

## 2019-03-22 DIAGNOSIS — K21.9 GASTRO-ESOPHAGEAL REFLUX DISEASE WITHOUT ESOPHAGITIS: ICD-10-CM

## 2019-03-22 DIAGNOSIS — E11.9 TYPE 2 DIABETES MELLITUS WITHOUT COMPLICATIONS: ICD-10-CM

## 2019-03-22 DIAGNOSIS — R47.81 SLURRED SPEECH: ICD-10-CM

## 2019-10-02 ENCOUNTER — OUTPATIENT (OUTPATIENT)
Dept: OUTPATIENT SERVICES | Facility: HOSPITAL | Age: 79
LOS: 1 days | Discharge: HOME | End: 2019-10-02

## 2019-10-02 DIAGNOSIS — Z98.890 OTHER SPECIFIED POSTPROCEDURAL STATES: Chronic | ICD-10-CM

## 2019-10-02 DIAGNOSIS — R53.1 WEAKNESS: ICD-10-CM

## 2019-10-02 DIAGNOSIS — G12.21 AMYOTROPHIC LATERAL SCLEROSIS: ICD-10-CM

## 2019-10-06 ENCOUNTER — EMERGENCY (EMERGENCY)
Facility: HOSPITAL | Age: 79
LOS: 0 days | Discharge: HOME | End: 2019-10-06
Attending: EMERGENCY MEDICINE | Admitting: EMERGENCY MEDICINE
Payer: MEDICAID

## 2019-10-06 VITALS
DIASTOLIC BLOOD PRESSURE: 69 MMHG | RESPIRATION RATE: 20 BRPM | TEMPERATURE: 96 F | SYSTOLIC BLOOD PRESSURE: 133 MMHG | OXYGEN SATURATION: 97 % | HEART RATE: 90 BPM

## 2019-10-06 VITALS — WEIGHT: 117.95 LBS

## 2019-10-06 DIAGNOSIS — Z90.49 ACQUIRED ABSENCE OF OTHER SPECIFIED PARTS OF DIGESTIVE TRACT: Chronic | ICD-10-CM

## 2019-10-06 DIAGNOSIS — Z90.49 ACQUIRED ABSENCE OF OTHER SPECIFIED PARTS OF DIGESTIVE TRACT: ICD-10-CM

## 2019-10-06 DIAGNOSIS — Z86.69 PERSONAL HISTORY OF OTHER DISEASES OF THE NERVOUS SYSTEM AND SENSE ORGANS: Chronic | ICD-10-CM

## 2019-10-06 DIAGNOSIS — R06.02 SHORTNESS OF BREATH: ICD-10-CM

## 2019-10-06 DIAGNOSIS — E11.9 TYPE 2 DIABETES MELLITUS WITHOUT COMPLICATIONS: Chronic | ICD-10-CM

## 2019-10-06 DIAGNOSIS — I10 ESSENTIAL (PRIMARY) HYPERTENSION: Chronic | ICD-10-CM

## 2019-10-06 LAB
ALBUMIN SERPL ELPH-MCNC: 4.9 G/DL — SIGNIFICANT CHANGE UP (ref 3.5–5.2)
ALP SERPL-CCNC: 53 U/L — SIGNIFICANT CHANGE UP (ref 30–115)
ALT FLD-CCNC: 16 U/L — SIGNIFICANT CHANGE UP (ref 0–41)
ANION GAP SERPL CALC-SCNC: 13 MMOL/L — SIGNIFICANT CHANGE UP (ref 7–14)
AST SERPL-CCNC: 37 U/L — SIGNIFICANT CHANGE UP (ref 0–41)
BILIRUB SERPL-MCNC: 1.3 MG/DL — HIGH (ref 0.2–1.2)
BUN SERPL-MCNC: 11 MG/DL — SIGNIFICANT CHANGE UP (ref 10–20)
CALCIUM SERPL-MCNC: 10.3 MG/DL — HIGH (ref 8.5–10.1)
CHLORIDE SERPL-SCNC: 94 MMOL/L — LOW (ref 98–110)
CO2 SERPL-SCNC: 26 MMOL/L — SIGNIFICANT CHANGE UP (ref 17–32)
CREAT SERPL-MCNC: 0.6 MG/DL — LOW (ref 0.7–1.5)
GLUCOSE SERPL-MCNC: 134 MG/DL — HIGH (ref 70–99)
HCT VFR BLD CALC: 43.4 % — SIGNIFICANT CHANGE UP (ref 37–47)
HGB BLD-MCNC: 14.8 G/DL — SIGNIFICANT CHANGE UP (ref 12–16)
MCHC RBC-ENTMCNC: 30.1 PG — SIGNIFICANT CHANGE UP (ref 27–31)
MCHC RBC-ENTMCNC: 34.1 G/DL — SIGNIFICANT CHANGE UP (ref 32–37)
MCV RBC AUTO: 88.4 FL — SIGNIFICANT CHANGE UP (ref 81–99)
NRBC # BLD: 0 /100 WBCS — SIGNIFICANT CHANGE UP (ref 0–0)
PLATELET # BLD AUTO: 239 K/UL — SIGNIFICANT CHANGE UP (ref 130–400)
POTASSIUM SERPL-MCNC: 5.6 MMOL/L — HIGH (ref 3.5–5)
POTASSIUM SERPL-SCNC: 5.6 MMOL/L — HIGH (ref 3.5–5)
PROT SERPL-MCNC: 7.5 G/DL — SIGNIFICANT CHANGE UP (ref 6–8)
RBC # BLD: 4.91 M/UL — SIGNIFICANT CHANGE UP (ref 4.2–5.4)
RBC # FLD: 12.4 % — SIGNIFICANT CHANGE UP (ref 11.5–14.5)
SODIUM SERPL-SCNC: 133 MMOL/L — LOW (ref 135–146)
TROPONIN T SERPL-MCNC: <0.01 NG/ML — SIGNIFICANT CHANGE UP
WBC # BLD: 8.37 K/UL — SIGNIFICANT CHANGE UP (ref 4.8–10.8)
WBC # FLD AUTO: 8.37 K/UL — SIGNIFICANT CHANGE UP (ref 4.8–10.8)

## 2019-10-06 PROCEDURE — 93010 ELECTROCARDIOGRAM REPORT: CPT

## 2019-10-06 PROCEDURE — 99285 EMERGENCY DEPT VISIT HI MDM: CPT

## 2019-10-06 PROCEDURE — 71046 X-RAY EXAM CHEST 2 VIEWS: CPT | Mod: 26

## 2019-10-06 NOTE — ED PROVIDER NOTE - PSH
DM (diabetes mellitus)    H/O multiple sclerosis    History of cholecystectomy    HTN (hypertension)    S/P appendectomy

## 2019-10-06 NOTE — ED PROVIDER NOTE - OBJECTIVE STATEMENT
79yF pmhx HTN, DM, ALS accompanied by children c/o SOB x1wk; pt is non-verbal for past 4mo due to complications of ALS, hx provided by children--state that pt is intermittently short of breath, not exacerbated by activity as pt can still walk up and down stairs w/o worsening sx; pt denies chest pain, headache, dizziness, blurred vision, fever/chills, n/v/d, dysuria; pt never smoked. Children report normal bloodwork last week; in addition, EMS was called and evaluated pt in home, incl EKG, said transport to hospital was not necessary. Pt drooling but children state that is her baseline for past 8mos or so as she is afraid of aspiration.

## 2019-10-06 NOTE — ED PROVIDER NOTE - NS ED ROS FT
Review of Systems:  	•	CONSTITUTIONAL - no fever, no diaphoresis  	•	SKIN - no rash, no lesions  	•	HEMATOLOGIC - no bleeding, no bruising  	•	EYES - no discharge, no injection  	•	ENT - no otorrhea, no rhinorrhea  	•	RESPIRATORY - +shortness of breath, no cough  	•	CARDIAC - no chest pain, no palpitations  	•	GI - no abd pain, no nausea, no vomiting, no diarrhea, no constipation, no bleeding  	•	GENITO-URINARY - no discharge, no dysuria, no hematuria  	•	MUSCULOSKELETAL - no joint paint, no swelling, no redness  	•	NEUROLOGIC - no weakness, no headache, no anesthesia, no paresthesias

## 2019-10-06 NOTE — ED PROVIDER NOTE - PHYSICAL EXAMINATION
Vital Signs: Reviewed  GEN: alert, NAD  HEAD:  normocephalic, atraumatic  EYES:  PERRLA; conjunctivae without injection, drainage or discharge  ENMT:  nasal mucosa moist; mouth moist without ulcerations or lesions; throat moist without erythema, exudate, ulcerations or lesions  NECK:  supple, no masses, no significant lymphadenopathy  CARDIAC:  regular rate, normal S1 and S2, no murmurs, rubs or gallops  RESP:  respiratory rate and effort appear normal for age; lungs are clear to auscultation bilaterally; no rales or wheezes  ABDOMEN:  soft, nontender, nondistended, no masses; g-tube in place  MUSCULOSKELETAL/NEURO:  normal movement, normal tone  SKIN:  normal skin color for age and race, well-perfused; warm and dry; not cyanotic

## 2019-10-06 NOTE — ED PROVIDER NOTE - NSFOLLOWUPINSTRUCTIONS_ED_ALL_ED_FT
Please follow up with Dr Redd in 1-2 days for further evaluation.    Shortness of breath    Shortness of breath means you have trouble breathing and could indicate a medical problem. Causes include lung diseases, heart disease, low amount of red blood cells (anemia), poor physical fitness, being overweight, smoking, etc. Your health care provider may not be able to find a cause for your shortness of breath after your exam. In this case, it is important to have a follow-up exam with your primary care physician as instructed. If medicines were prescribed, take them as directed for the full length of time directed. Refrain from tobacco products.    SEEK IMMEDIATE MEDICAL CARE IF YOU HAVE THE FOLLOWING SYMPTOMS: worsening shortness of breath, chest pain, back pain, abdominal pain, fever, coughing up blood, lightheadedness/dizziness. Dr Redd's office will have a home visit nurse see you tomorrow--please be sure to be at home for the further evaluation.     Shortness of breath    Shortness of breath means you have trouble breathing and could indicate a medical problem. Causes include lung diseases, heart disease, low amount of red blood cells (anemia), poor physical fitness, being overweight, smoking, etc. Your health care provider may not be able to find a cause for your shortness of breath after your exam. In this case, it is important to have a follow-up exam with your primary care physician as instructed. If medicines were prescribed, take them as directed for the full length of time directed. Refrain from tobacco products.    SEEK IMMEDIATE MEDICAL CARE IF YOU HAVE THE FOLLOWING SYMPTOMS: worsening shortness of breath, chest pain, back pain, abdominal pain, fever, coughing up blood, lightheadedness/dizziness.

## 2019-10-06 NOTE — ED PROVIDER NOTE - ATTENDING CONTRIBUTION TO CARE
80 yo female with PMH DM, HTN, ALS, nonverbal but communicative BIB family for c/o SOB earlier. Pt reported feeling better in ED. Denied any CP, abdominal pain, V/D, fevers or cough. Family report she is at baseline in ED. Pt able to ambulate without difficulty, no exertional SOB or change in activity level. No trauma or falls.     VITAL SIGNS: noted  CONSTITUTIONAL: Well-developed; well-nourished; in no acute distress  HEAD: Normocephalic; atraumatic  EYES: PERRL, EOM intact; conjunctiva and sclera clear  ENT: No nasal discharge; airway clear. MMM. + drooling (baseline per family), no trismus  NECK: Supple; non tender. No anterior cervical lymphadenopathy noted  CARD: S1, S2 normal; no murmurs, gallops, or rubs. Regular rate and rhythm  RESP: CTAB/L, no wheezes, rales or rhonchi  ABD: Normal bowel sounds; soft; non-distended; non-tender; g tube in place, no signs cellulitis, no CVA tenderness  EXT: Normal ROM. No calf tenderness or edema. Distal pulses intact  NEURO: Alert, oriented. Grossly unremarkable. No focal deficits  SKIN: Skin exam is warm and dry

## 2019-10-06 NOTE — ED PROVIDER NOTE - PATIENT PORTAL LINK FT
You can access the FollowMyHealth Patient Portal offered by [facility] by registering at the following website: [email or web address]. By joining [employer]’s FollowMyHealth portal, you will also be able to view your health information using other applications (apps) compatible with our system.

## 2019-10-06 NOTE — ED PROVIDER NOTE - PROGRESS NOTE DETAILS
Pt reports improved sx, seen at bedside resting comfortably. Per PMD Dr Raphael Redd, pt w/ history of anxiety related to ALS but disease is currently stable. Last seen in office 1mo ago. Will send home visit NP to see pt tomorrow.

## 2019-10-06 NOTE — ED ADULT NURSE NOTE - NSIMPLEMENTINTERV_GEN_ALL_ED
Implemented All Fall with Harm Risk Interventions:  [place] to call system. Call bell, personal items and telephone within reach. Instruct patient to call for assistance. Room bathroom lighting operational. Non-slip footwear when patient is off stretcher. Physically safe environment: no spills, clutter or unnecessary equipment. Stretcher in lowest position, wheels locked, appropriate side rails in place. Provide visual cue, wrist band, yellow gown, etc. Monitor gait and stability. Monitor for mental status changes and reorient to person, place, and time. Review medications for side effects contributing to fall risk. Reinforce activity limits and safety measures with patient and family. Provide visual clues: red socks.

## 2019-10-06 NOTE — ED PROVIDER NOTE - CLINICAL SUMMARY MEDICAL DECISION MAKING FREE TEXT BOX
Pt seen and evaluated for SOB, workup unremarkable in EC and pt reported feeling much better. Pt with extensive family support, reported she was at baseline and that this has happened before. pt becomes anxious and feels better once evaluated. pt requesting to go home, family comfortable with d/c. case discussed with PMD Dr. Redd who knows pt well and planned to have NP do home visit for reevaluation and pt/family agreed. Strict return precautions advised and pt/family verbalized understanding.

## 2019-10-29 PROBLEM — Z00.00 ENCOUNTER FOR PREVENTIVE HEALTH EXAMINATION: Status: ACTIVE | Noted: 2019-10-29

## 2019-11-01 ENCOUNTER — APPOINTMENT (OUTPATIENT)
Dept: NEUROLOGY | Facility: CLINIC | Age: 79
End: 2019-11-01
Payer: MEDICAID

## 2019-11-01 VITALS
DIASTOLIC BLOOD PRESSURE: 84 MMHG | WEIGHT: 120 LBS | OXYGEN SATURATION: 98 % | BODY MASS INDEX: 22.08 KG/M2 | HEART RATE: 86 BPM | HEIGHT: 62 IN | TEMPERATURE: 95.2 F | SYSTOLIC BLOOD PRESSURE: 135 MMHG

## 2019-11-01 PROCEDURE — 99205 OFFICE O/P NEW HI 60 MIN: CPT

## 2019-11-01 RX ORDER — AMLODIPINE BESYLATE 5 MG/1
5 TABLET ORAL DAILY
Refills: 0 | Status: ACTIVE | COMMUNITY

## 2019-11-01 RX ORDER — ESOMEPRAZOLE MAGNESIUM 20 MG/1
20 CAPSULE, DELAYED RELEASE ORAL
Refills: 0 | Status: ACTIVE | COMMUNITY

## 2019-11-01 RX ORDER — HYDROCHLOROTHIAZIDE 25 MG/1
25 TABLET ORAL DAILY
Refills: 0 | Status: ACTIVE | COMMUNITY

## 2019-11-01 RX ORDER — METFORMIN HYDROCHLORIDE 500 MG/1
500 TABLET, COATED ORAL TWICE DAILY
Refills: 0 | Status: ACTIVE | COMMUNITY

## 2019-11-01 NOTE — HISTORY OF PRESENT ILLNESS
[FreeTextEntry1] : Ms. Galarza is a 78 yo RHF referred to us by [facility]SA for progressive weakness, atrophy, ataxia and speech/swallow difficulties over the last several months.  Pt resides in [place] and since February 2019 has had acute rapidly progressive decline initially starting with b/l LE weakness and ataxia followed by bulbar dysfxn with dysarthria and dysphagia had recen PEG placed 2 months ago.  Dysarthria is severe to point where she is barely intelligible.  Per family was seen and diagnosed by Dr. Burroughs with ALS.  Was started on tube feeds via PEG since has had significant weight loss over the last few months.  Currently remains wheelchair bound.  Had weakness in the upper extremities also.  Was started on Riluzole 50 mg BID which family stopped since she had worsening SOB.  Pt has orthopnea with problems breathing with increasing SOB.  Has appointment with Pulmonologist Dr. Taylor in the next 2 weeks.  Family has noticed excessive secretions which also makes laying down difficult.

## 2019-11-01 NOTE — PHYSICAL EXAM
[FreeTextEntry1] : General:   The patient is pleasant, cooperative, cachectic, drooling\par Neurologic examination:  awake, alert, follows commands.  Severe dysarthria unintelligible. \par Cranial nerves examination: facial weakness slight, tongue weakness unable to extend fully with atrophy lateral edges and fasciculations.  edentulous. \par Motor examination:   significant atrophy with faciculations all limbs. \par Formal Muscle Strength Testing: (MRC grade R/L) 4/4 UE, 2/2 LE.  NE/NF 4+/4  \par Reflexes:   3+ b/l pectoralis, biceps, triceps, brachioradialis, patella and Achilles.  Plantar response downgoing b/l.  Jaw jerk, Funmilayo, clonus absent.  \par Sensory examination:  grossly intact  \par Cerebellum:   FTN/HKS intact with normal LUCY in all limbs.   Gait wheelchair bound, unable to stand. \par \par ALS-FRS 18\par VC 0.5L\par NIF not tolerated\par O2 84% RA       \par

## 2019-11-01 NOTE — ASSESSMENT
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  Recommend restarting Riluzole since SOB likely progression of disease rather than medication side effect.  Will start Nuedexta for sialorrhea and possible underlying FTD.  Pt not candidate for radicava since wheelchair bound.  Recommend pulmonary evaluation for possible VPAP and pulmonary assessment.  Consider advanced directives counseling at next visit.  Pt to f/u with nutritionist at PMD office as well for recommendations regarding tube feeding.  \par \par Pt is currently non-ambulatory and at high risk for recurrent falls during my face-to-face evaluation.  Pt would likely benefit from formal evaluation for wheelchair.  Recommend wheelchair clinic evaluation.  \par \par Plan:\par - restart Riluzole 50 mg BID\par - start Nuedexta 1 tab PO BID\par - pulmonary evaluation for VPAP\par - nutritionist f/u at PMD\par - wheelchair clinic evaluation\par - Home care services\par - RTC [facility] 1 month

## 2019-12-06 ENCOUNTER — RX RENEWAL (OUTPATIENT)
Age: 79
End: 2019-12-06

## 2019-12-06 ENCOUNTER — APPOINTMENT (OUTPATIENT)
Dept: NEUROLOGY | Facility: CLINIC | Age: 79
End: 2019-12-06

## 2019-12-06 RX ORDER — NUTRITIONAL SUPPLEMENT/FIBER 0.05 G-1.5
LIQUID (ML) ORAL
Qty: 60 | Refills: 5 | Status: ACTIVE | COMMUNITY
Start: 2019-12-06 | End: 1900-01-01

## 2019-12-11 ENCOUNTER — OTHER (OUTPATIENT)
Age: 79
End: 2019-12-11

## 2019-12-17 ENCOUNTER — OTHER (OUTPATIENT)
Age: 79
End: 2019-12-17

## 2020-01-03 ENCOUNTER — APPOINTMENT (OUTPATIENT)
Dept: NEUROLOGY | Facility: CLINIC | Age: 80
End: 2020-01-03
Payer: MEDICAID

## 2020-01-03 VITALS
SYSTOLIC BLOOD PRESSURE: 135 MMHG | HEART RATE: 98 BPM | WEIGHT: 119 LBS | DIASTOLIC BLOOD PRESSURE: 85 MMHG | BODY MASS INDEX: 21.9 KG/M2 | HEIGHT: 62 IN | TEMPERATURE: 98.6 F | OXYGEN SATURATION: 97 %

## 2020-01-03 PROCEDURE — 99213 OFFICE O/P EST LOW 20 MIN: CPT

## 2020-01-06 NOTE — HISTORY OF PRESENT ILLNESS
[FreeTextEntry1] : patient returns for follow up at ALS clinic today. since last visit:\par she has had no new resp respiratory issues and is doing well with avap. she does report back pain, likely from wheelchair\par she has been unable to obtain PEG feeds and family has been giving her puree through the PEG. since the PEG placement she had had lower abd pain and constipation.

## 2020-01-06 NOTE — ASSESSMENT
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  \par \par Plan:\par cont Riluzole 50 mg BID\par - cont  Nuedexta 1 tab PO BID\par - nutritionist referral to Dr Peña\par start glycopyrrolate for secretions\par -f/u wheelchair clinic\par - RTC [facility] 1 month. \par \par

## 2020-01-06 NOTE — PHYSICAL EXAM
[FreeTextEntry1] : \par General: The patient is pleasant, cooperative, cachectic, drooling\par Neurologic examination: awake, alert, follows commands. anarthric  \par Cranial nerves examination: facial weakness slight, tongue weakness unable to extend fully with atrophy lateral edges and fasciculations. edentulous. \par Motor examination: significant atrophy with faciculations x 4 . \par Formal Muscle Strength Testin/4 ue bilaterally, trace le b \par dtrs brisk x 4 \par Sensory examination: intact to lt\par Cerebellum: FTN/HKS intact with normal LUCY in all limbs. Gait wheelchair bound, unable to stand. \par \par

## 2020-02-27 DIAGNOSIS — G12.21 AMYOTROPHIC LATERAL SCLEROSIS: ICD-10-CM

## 2020-03-12 RX ORDER — PEDI NUTRITION,MILK BASED,IRON 0.03 G-0.6
LIQUID (ML) ORAL 3 TIMES DAILY
Qty: 42660 | Refills: 5 | Status: ACTIVE | COMMUNITY
Start: 2020-01-03 | End: 1900-01-01

## 2020-04-10 RX ORDER — RILUZOLE 50 MG/1
50 TABLET, FILM COATED ORAL
Qty: 60 | Refills: 5 | Status: ACTIVE | COMMUNITY
Start: 2019-11-01 | End: 1900-01-01

## 2020-04-10 RX ORDER — DEXTROMETHORPHAN HYDROBROMIDE AND QUINIDINE SULFATE 20; 10 MG/1; MG/1
20-10 CAPSULE, GELATIN COATED ORAL
Qty: 60 | Refills: 5 | Status: ACTIVE | COMMUNITY
Start: 2019-11-01 | End: 1900-01-01

## 2020-05-04 RX ORDER — TRAZODONE HYDROCHLORIDE 50 MG/1
50 TABLET ORAL
Qty: 15 | Refills: 0 | Status: ACTIVE | COMMUNITY
Start: 2020-05-04 | End: 1900-01-01

## 2020-07-29 ENCOUNTER — APPOINTMENT (OUTPATIENT)
Dept: NEUROLOGY | Facility: CLINIC | Age: 80
End: 2020-07-29

## 2020-12-04 ENCOUNTER — APPOINTMENT (OUTPATIENT)
Dept: NEUROLOGY | Facility: CLINIC | Age: 80
End: 2020-12-04

## 2021-01-27 NOTE — ED PROVIDER NOTE - CARE PROVIDER_API CALL
Referral placed to [facility] per patient request in regards to EMG results from 1/26/2021 Americo Phillips), Family Medicine  [facility] [place] [address]  Phone: [phone]  Fax: [phone]    Taurus Pierre), Gastroenterology; Internal Medicine  [address] [place] [address]  Phone: [phone]  Fax: [phone]

## 2021-02-10 NOTE — ED PROVIDER NOTE - CARE PROVIDER_API CALL
----------------------------------------------------------------  [facility] Number:  [phone]     Call with any questions about your care and for scheduling assistance.     Calls are returned Monday through Friday between 8 AM and 4:30 PM. We usually get back to you within 2 business days depending on the issue/request.    If we are prescribing your medications:    For opioid medication refills, call the clinic or send a [employer]apt[employer] message 7 days in advance.  Please include:    Name of requested medication    Name of the pharmacy.    For non-opioid medications, call your pharmacy directly to request a refill. Please allow 3-4 days to be processed.     Per [place] State Law:    All controlled substance prescriptions must be filled within 30 days of being written.      For those controlled substances allowing refills, pickup must occur within 30 days of last fill.      We believe regular attendance is key to your success in our program!      Any time you are unable to keep your appointment we ask that you call us at least 24 hours in advance to cancel.This will allow us to offer the appointment time to another patient.     Multiple missed appointments may lead to dismissal from the clinic.         Purnima Redd)  Internal Medicine; Pediatrics  [address] [place] [address]  Phone: [phone]  Fax: [phone]  Follow Up Time: 1-3 Days 02-Dec-2017

## 2021-06-07 NOTE — ED ADULT TRIAGE NOTE - NS ED NURSE BANDS TYPE
Reason for Disposition  • BP  >= 160/100    Protocols used: HIGH BLOOD PRESSURE-A-AH     Name band;

## 2021-10-12 NOTE — PATIENT PROFILE ADULT - NSPROCHRONICPAINRELIEVE_GEN_A_NUR
Goal Outcome Evaluation:              Outcome Summary: Pt resting in bed at this time. Complaints of nausea and pain, PRN meds given. Potassium being replaced at this time. Will continue to monitor.   rest

## 2023-12-20 ENCOUNTER — NON-APPOINTMENT (OUTPATIENT)
Age: 83
End: 2023-12-20

## 2023-12-20 DIAGNOSIS — Z86.69 PERSONAL HISTORY OF OTHER DISEASES OF THE NERVOUS SYSTEM AND SENSE ORGANS: ICD-10-CM

## 2023-12-20 DIAGNOSIS — M33.20 POLYMYOSITIS, ORGAN INVOLVEMENT UNSPECIFIED: ICD-10-CM

## 2023-12-20 DIAGNOSIS — I10 ESSENTIAL (PRIMARY) HYPERTENSION: ICD-10-CM

## 2023-12-20 DIAGNOSIS — G24.9 DYSTONIA, UNSPECIFIED: ICD-10-CM

## 2023-12-20 DIAGNOSIS — J38.3 OTHER DISEASES OF VOCAL CORDS: ICD-10-CM

## 2023-12-20 DIAGNOSIS — Z87.898 PERSONAL HISTORY OF OTHER SPECIFIED CONDITIONS: ICD-10-CM

## 2023-12-20 DIAGNOSIS — G24.3 SPASMODIC TORTICOLLIS: ICD-10-CM

## 2023-12-20 DIAGNOSIS — G40.109 LOCALIZATION-RELATED (FOCAL) (PARTIAL) SYMPTOMATIC EPILEPSY AND EPILEPTIC SYNDROMES WITH SIMPLE PARTIAL SEIZURES, NOT INTRACTABLE, W/OUT STATUS EPILEPTICUS: ICD-10-CM

## 2023-12-20 DIAGNOSIS — G62.9 POLYNEUROPATHY, UNSPECIFIED: ICD-10-CM

## 2023-12-20 DIAGNOSIS — E11.9 TYPE 2 DIABETES MELLITUS W/OUT COMPLICATIONS: ICD-10-CM

## 2023-12-20 DIAGNOSIS — R42 DIZZINESS AND GIDDINESS: ICD-10-CM

## 2023-12-20 DIAGNOSIS — M54.18 RADICULOPATHY, SACRAL AND SACROCOCCYGEAL REGION: ICD-10-CM
